# Patient Record
Sex: MALE | Race: WHITE | NOT HISPANIC OR LATINO | Employment: OTHER | URBAN - METROPOLITAN AREA
[De-identification: names, ages, dates, MRNs, and addresses within clinical notes are randomized per-mention and may not be internally consistent; named-entity substitution may affect disease eponyms.]

---

## 2023-09-10 PROBLEM — C91.10 CHRONIC LYMPHOCYTIC LEUKEMIA (MULTI): Status: ACTIVE | Noted: 2023-09-10

## 2023-09-10 PROBLEM — E78.5 HYPERLIPIDEMIA: Status: ACTIVE | Noted: 2023-09-10

## 2023-09-10 PROBLEM — G47.33 OSA (OBSTRUCTIVE SLEEP APNEA): Status: ACTIVE | Noted: 2023-09-10

## 2023-09-10 PROBLEM — D72.829 LEUKOCYTOSIS: Status: ACTIVE | Noted: 2023-09-10

## 2023-09-10 PROBLEM — M19.90 OSTEOARTHRITIS: Status: ACTIVE | Noted: 2023-09-10

## 2023-09-10 PROBLEM — N13.8 BENIGN PROSTATIC HYPERPLASIA WITH URINARY OBSTRUCTION: Status: ACTIVE | Noted: 2023-09-10

## 2023-09-10 PROBLEM — I49.8 BIGEMINY: Status: ACTIVE | Noted: 2023-09-10

## 2023-09-10 PROBLEM — H60.90 OTITIS EXTERNA: Status: ACTIVE | Noted: 2023-09-10

## 2023-09-10 PROBLEM — G47.61 PERIODIC LIMB MOVEMENT: Status: ACTIVE | Noted: 2023-09-10

## 2023-09-10 PROBLEM — R73.01 IMPAIRED FASTING GLUCOSE: Status: ACTIVE | Noted: 2023-09-10

## 2023-09-10 PROBLEM — E66.01 MORBID OBESITY DUE TO EXCESS CALORIES (MULTI): Status: ACTIVE | Noted: 2023-09-10

## 2023-09-10 PROBLEM — E03.9 HYPOTHYROIDISM: Status: ACTIVE | Noted: 2023-09-10

## 2023-09-10 PROBLEM — G47.8: Status: ACTIVE | Noted: 2023-09-10

## 2023-09-10 PROBLEM — N40.1 BENIGN PROSTATIC HYPERPLASIA WITH URINARY OBSTRUCTION: Status: ACTIVE | Noted: 2023-09-10

## 2023-09-10 PROBLEM — R00.1 SINUS BRADYCARDIA: Status: ACTIVE | Noted: 2023-09-10

## 2023-09-10 PROBLEM — I71.40 ABDOMINAL AORTIC ANEURYSM WITHOUT RUPTURE (CMS-HCC): Status: ACTIVE | Noted: 2023-09-10

## 2023-09-10 PROBLEM — G47.34 NOCTURNAL HYPOXEMIA: Status: ACTIVE | Noted: 2023-09-10

## 2023-09-10 PROBLEM — E55.9 VITAMIN D DEFICIENCY: Status: ACTIVE | Noted: 2023-09-10

## 2023-09-10 PROBLEM — I10 HYPERTENSION: Status: ACTIVE | Noted: 2023-09-10

## 2023-09-10 RX ORDER — TAMSULOSIN HYDROCHLORIDE 0.4 MG/1
1 CAPSULE ORAL DAILY
COMMUNITY
End: 2024-04-24

## 2023-09-10 RX ORDER — FUROSEMIDE 20 MG/1
1 TABLET ORAL DAILY
COMMUNITY
End: 2024-03-04

## 2023-09-10 RX ORDER — LEVOTHYROXINE SODIUM 100 UG/1
1 TABLET ORAL DAILY
COMMUNITY
End: 2024-05-09

## 2023-09-10 RX ORDER — PRAVASTATIN SODIUM 80 MG/1
1 TABLET ORAL DAILY
COMMUNITY
End: 2024-04-04

## 2023-09-10 RX ORDER — NAPROXEN SODIUM 220 MG
1 TABLET ORAL EVERY 12 HOURS PRN
COMMUNITY

## 2023-12-20 DIAGNOSIS — C91.10 CHRONIC LYMPHOCYTIC LEUKEMIA (MULTI): Primary | ICD-10-CM

## 2023-12-21 ENCOUNTER — APPOINTMENT (OUTPATIENT)
Dept: HEMATOLOGY/ONCOLOGY | Facility: CLINIC | Age: 73
End: 2023-12-21
Payer: MEDICARE

## 2024-01-17 ENCOUNTER — OFFICE VISIT (OUTPATIENT)
Dept: HEMATOLOGY/ONCOLOGY | Facility: CLINIC | Age: 74
End: 2024-01-17
Payer: MEDICARE

## 2024-01-17 ENCOUNTER — LAB (OUTPATIENT)
Dept: LAB | Facility: CLINIC | Age: 74
End: 2024-01-17
Payer: MEDICARE

## 2024-01-17 VITALS
DIASTOLIC BLOOD PRESSURE: 87 MMHG | TEMPERATURE: 97.2 F | WEIGHT: 281.64 LBS | HEART RATE: 57 BPM | SYSTOLIC BLOOD PRESSURE: 147 MMHG | RESPIRATION RATE: 18 BRPM | BODY MASS INDEX: 41.43 KG/M2 | OXYGEN SATURATION: 94 %

## 2024-01-17 DIAGNOSIS — C91.10 CHRONIC LYMPHOCYTIC LEUKEMIA (MULTI): ICD-10-CM

## 2024-01-17 DIAGNOSIS — C91.10 CHRONIC LYMPHOCYTIC LEUKEMIA (MULTI): Primary | ICD-10-CM

## 2024-01-17 LAB
ALBUMIN SERPL BCP-MCNC: 4.3 G/DL (ref 3.4–5)
ALP SERPL-CCNC: 63 U/L (ref 33–136)
ALT SERPL W P-5'-P-CCNC: 25 U/L (ref 10–52)
ANION GAP SERPL CALC-SCNC: 13 MMOL/L (ref 10–20)
AST SERPL W P-5'-P-CCNC: 19 U/L (ref 9–39)
BASOPHILS # BLD MANUAL: 0 X10*3/UL (ref 0–0.1)
BASOPHILS NFR BLD MANUAL: 0 %
BILIRUB SERPL-MCNC: 0.6 MG/DL (ref 0–1.2)
BUN SERPL-MCNC: 24 MG/DL (ref 6–23)
CALCIUM SERPL-MCNC: 9 MG/DL (ref 8.6–10.3)
CHLORIDE SERPL-SCNC: 107 MMOL/L (ref 98–107)
CO2 SERPL-SCNC: 25 MMOL/L (ref 21–32)
CREAT SERPL-MCNC: 1.04 MG/DL (ref 0.5–1.3)
EGFRCR SERPLBLD CKD-EPI 2021: 75 ML/MIN/1.73M*2
EOSINOPHIL # BLD MANUAL: 0.29 X10*3/UL (ref 0–0.4)
EOSINOPHIL NFR BLD MANUAL: 2 %
ERYTHROCYTE [DISTWIDTH] IN BLOOD BY AUTOMATED COUNT: 13.2 % (ref 11.5–14.5)
GLUCOSE SERPL-MCNC: 130 MG/DL (ref 74–99)
HCT VFR BLD AUTO: 43.4 % (ref 41–52)
HGB BLD-MCNC: 14.9 G/DL (ref 13.5–17.5)
IMM GRANULOCYTES # BLD AUTO: 0.04 X10*3/UL (ref 0–0.5)
IMM GRANULOCYTES NFR BLD AUTO: 0.3 % (ref 0–0.9)
LDH SERPL L TO P-CCNC: 156 U/L (ref 84–246)
LYMPHOCYTES # BLD MANUAL: 8.87 X10*3/UL (ref 0.8–3)
LYMPHOCYTES NFR BLD MANUAL: 62 %
MCH RBC QN AUTO: 31.7 PG (ref 26–34)
MCHC RBC AUTO-ENTMCNC: 34.3 G/DL (ref 32–36)
MCV RBC AUTO: 92 FL (ref 80–100)
MONOCYTES # BLD MANUAL: 0.72 X10*3/UL (ref 0.05–0.8)
MONOCYTES NFR BLD MANUAL: 5 %
NEUTS SEG # BLD MANUAL: 4.43 X10*3/UL (ref 1.6–5)
NEUTS SEG NFR BLD MANUAL: 31 %
NRBC BLD-RTO: 0 /100 WBCS (ref 0–0)
PLATELET # BLD AUTO: 203 X10*3/UL (ref 150–450)
POTASSIUM SERPL-SCNC: 4 MMOL/L (ref 3.5–5.3)
PROT SERPL-MCNC: 6.9 G/DL (ref 6.4–8.2)
RBC # BLD AUTO: 4.7 X10*6/UL (ref 4.5–5.9)
RBC MORPH BLD: ABNORMAL
SODIUM SERPL-SCNC: 141 MMOL/L (ref 136–145)
TOTAL CELLS COUNTED BLD: 100
WBC # BLD AUTO: 14.3 X10*3/UL (ref 4.4–11.3)

## 2024-01-17 PROCEDURE — 99213 OFFICE O/P EST LOW 20 MIN: CPT | Performed by: NURSE PRACTITIONER

## 2024-01-17 PROCEDURE — 84075 ASSAY ALKALINE PHOSPHATASE: CPT

## 2024-01-17 PROCEDURE — 85027 COMPLETE CBC AUTOMATED: CPT

## 2024-01-17 PROCEDURE — 1125F AMNT PAIN NOTED PAIN PRSNT: CPT | Performed by: NURSE PRACTITIONER

## 2024-01-17 PROCEDURE — 3079F DIAST BP 80-89 MM HG: CPT | Performed by: NURSE PRACTITIONER

## 2024-01-17 PROCEDURE — 83615 LACTATE (LD) (LDH) ENZYME: CPT | Performed by: NURSE PRACTITIONER

## 2024-01-17 PROCEDURE — 1036F TOBACCO NON-USER: CPT | Performed by: NURSE PRACTITIONER

## 2024-01-17 PROCEDURE — 3077F SYST BP >= 140 MM HG: CPT | Performed by: NURSE PRACTITIONER

## 2024-01-17 PROCEDURE — 36415 COLL VENOUS BLD VENIPUNCTURE: CPT

## 2024-01-17 PROCEDURE — 85007 BL SMEAR W/DIFF WBC COUNT: CPT

## 2024-01-17 ASSESSMENT — COLUMBIA-SUICIDE SEVERITY RATING SCALE - C-SSRS
2. HAVE YOU ACTUALLY HAD ANY THOUGHTS OF KILLING YOURSELF?: NO
1. IN THE PAST MONTH, HAVE YOU WISHED YOU WERE DEAD OR WISHED YOU COULD GO TO SLEEP AND NOT WAKE UP?: NO
6. HAVE YOU EVER DONE ANYTHING, STARTED TO DO ANYTHING, OR PREPARED TO DO ANYTHING TO END YOUR LIFE?: NO

## 2024-01-17 ASSESSMENT — PATIENT HEALTH QUESTIONNAIRE - PHQ9
1. LITTLE INTEREST OR PLEASURE IN DOING THINGS: NOT AT ALL
SUM OF ALL RESPONSES TO PHQ9 QUESTIONS 1 AND 2: 0
2. FEELING DOWN, DEPRESSED OR HOPELESS: NOT AT ALL

## 2024-01-17 ASSESSMENT — ENCOUNTER SYMPTOMS
LOSS OF SENSATION IN FEET: 0
DEPRESSION: 0
OCCASIONAL FEELINGS OF UNSTEADINESS: 1

## 2024-01-17 ASSESSMENT — PAIN SCALES - GENERAL: PAINLEVEL: 6

## 2024-01-17 NOTE — PROGRESS NOTES
Patient ID: Silvino Jacobo is a 73 y.o. male.    History of Present Illness:  Patient had been referred by his primary care provider for an elevated white count of 23,000. Hemoglobin and platelets were normal. There was a predominance of lymphocytes. He was seen in our office initially in 2017. At that time, his white count was 23,600. Flow cytometry showed a B cell population consistent with CLL. FISH studies showed normal results. The patient has not required any treatments.    Interval History:  He is seen today in follow up evaluation. He denies any fevers, chills or night sweats. No cough, chest pain or shortness of breath. No nausea or vomiting. No diarrhea or constipation. He has not had any unexplained weight loss. No night sweats. No lymphadenopathy. He is working on improved diet choices.   He does have poor dentition. He has had several broken teeth. He has meet with an oral surgeon and is considering dentures with implants.    Review of Systems:  A review of systems has been completed and are negative for complaints except what is stated in the HPI and/or past medical history    Allergies:  NKDA    Medications:  Medication list reviewed with patient and updated in EMR    Past Medical History:  Hypercholesterolemia, hypertension, chronic lymphocytic leukemia of B-cell type not having achieved remission    Social History:  He used to smoke but quit in . He lives by himself. He is single. He used to work as a .     Vital Signs:  /87 (BP Location: Right arm, Patient Position: Sitting, BP Cuff Size: Large adult long)   Pulse 57   Temp 36.2 °C (97.2 °F) (Temporal)   Resp 18   Wt 128 kg (281 lb 10.2 oz)   SpO2 94%   BMI 41.43 kg/m²     Physical Exam:  ECO  Pain: 0  Constitutional: Well developed, awake/alert/oriented x3, no distress, alert and cooperative  Eyes: PER. sclera anicteric  ENMT: Oral mucosa moist  Respiratory/Thorax: Breathing is non-labored. Lungs are clear  to auscultation bilaterally. No adventitious breath sounds  Cardiovascular: S1-S2. Regular rate and rhythm. No murmurs, rubs, or gallops appreciated  Gastrointestinal: Abdomen soft nontender, nondistended, normal active bowel sounds. No hepatosplenomegly  Musculoskeletal: ROM intact, no joint swelling, normal strength  Extremities: normal extremities, no cyanosis, no edema, no clubbing  Lymphatics: no palpable lymphadenopathy   Neurologic: alert and oriented x3. Nonfocal exam. No myoclonus  Psychological: Pleasant, appropriate and easily engaged     Labs:  CBC date/time       WBC     HGB     HCT     PLT     Neut      13-Dec-2022 09:07   12.5(H) 15.8    44.5    206     N/A  20-Jun-2022 09:39   12.3(H) 15.2    43.1    159     N/A    June 20, 2023  WBC 13.9, HGB/HCT 15.0/43.3, ,000    January 17, 2024  WBC 14.3, hemoglobin 14.9, hematocrit 43.4, platelets 203,000  Absolute lymphocyte count 8.87    Assessment:  CLL:  - counts remain stable. He is not requiring treatment at this juncture.  - he may have dental work as necessary. We discussed monitoring for s/s infection.     Plan:  - 6 month follow-up  - labs on return     SHARON Patterson-CNP

## 2024-01-24 ENCOUNTER — OFFICE VISIT (OUTPATIENT)
Dept: SLEEP MEDICINE | Facility: CLINIC | Age: 74
End: 2024-01-24
Payer: MEDICARE

## 2024-01-24 VITALS
SYSTOLIC BLOOD PRESSURE: 130 MMHG | HEART RATE: 60 BPM | BODY MASS INDEX: 39.2 KG/M2 | HEIGHT: 71 IN | OXYGEN SATURATION: 98 % | WEIGHT: 280 LBS | DIASTOLIC BLOOD PRESSURE: 68 MMHG

## 2024-01-24 DIAGNOSIS — G47.33 OBSTRUCTIVE SLEEP APNEA (ADULT) (PEDIATRIC): Primary | ICD-10-CM

## 2024-01-24 DIAGNOSIS — H04.202 EYE TEARING, LEFT: ICD-10-CM

## 2024-01-24 DIAGNOSIS — J31.0 CHRONIC RHINITIS: ICD-10-CM

## 2024-01-24 DIAGNOSIS — G47.33 OSA (OBSTRUCTIVE SLEEP APNEA): ICD-10-CM

## 2024-01-24 PROCEDURE — 99214 OFFICE O/P EST MOD 30 MIN: CPT | Performed by: INTERNAL MEDICINE

## 2024-01-24 PROCEDURE — 1126F AMNT PAIN NOTED NONE PRSNT: CPT | Performed by: INTERNAL MEDICINE

## 2024-01-24 PROCEDURE — 1036F TOBACCO NON-USER: CPT | Performed by: INTERNAL MEDICINE

## 2024-01-24 PROCEDURE — 1159F MED LIST DOCD IN RCRD: CPT | Performed by: INTERNAL MEDICINE

## 2024-01-24 PROCEDURE — 3075F SYST BP GE 130 - 139MM HG: CPT | Performed by: INTERNAL MEDICINE

## 2024-01-24 PROCEDURE — 3078F DIAST BP <80 MM HG: CPT | Performed by: INTERNAL MEDICINE

## 2024-01-24 RX ORDER — FLUTICASONE PROPIONATE 50 MCG
2 SPRAY, SUSPENSION (ML) NASAL NIGHTLY
Qty: 16 G | Refills: 2 | Status: SHIPPED | OUTPATIENT
Start: 2024-01-24 | End: 2024-01-25 | Stop reason: SDUPTHER

## 2024-01-24 ASSESSMENT — SLEEP AND FATIGUE QUESTIONNAIRES
HOW LIKELY ARE YOU TO NOD OFF OR FALL ASLEEP WHEN YOU ARE A PASSENGER IN A CAR FOR AN HOUR WITHOUT A BREAK: WOULD NEVER DOZE
SITING INACTIVE IN A PUBLIC PLACE LIKE A CLASS ROOM OR A MOVIE THEATER: WOULD NEVER DOZE
SITTING AND READING: 1
HOW LIKELY ARE YOU TO NOD OFF OR FALL ASLEEP IN A CAR, WHILE STOPPED FOR A FEW MINUTES IN TRAFFIC: WOULD NEVER DOZE
HOW LIKELY ARE YOU TO NOD OFF OR FALL ASLEEP WHILE SITTING AND TALKING TO SOMEONE: WOULD NEVER DOZE
HOW LIKELY ARE YOU TO NOD OFF OR FALL ASLEEP WHILE SITTING AND READING: SLIGHT CHANCE OF DOZING
ESS-CHAD TOTAL SCORE: 4
HOW LIKELY ARE YOU TO NOD OFF OR FALL ASLEEP WHILE WATCHING TV: SLIGHT CHANCE OF DOZING
HOW LIKELY ARE YOU TO NOD OFF OR FALL ASLEEP WHILE SITTING QUIETLY AFTER LUNCH WITHOUT ALCOHOL: WOULD NEVER DOZE
HOW LIKELY ARE YOU TO NOD OFF OR FALL ASLEEP WHILE LYING DOWN TO REST IN THE AFTERNOON WHEN CIRCUMSTANCES PERMIT: MODERATE CHANCE OF DOZING

## 2024-01-24 ASSESSMENT — PAIN SCALES - GENERAL: PAINLEVEL: 0-NO PAIN

## 2024-01-24 NOTE — ASSESSMENT & PLAN NOTE
Could this be from nasal congestion blocking the lacrimal duct? Will reevaluate at next visit to see if there is improvement with topical nasal steroid. I did explain that there are multiple causes of tearing and that I am not certain that this is the etiology and he may consider investigating this further with his PCP

## 2024-01-24 NOTE — PATIENT INSTRUCTIONS
How to use nasal sprays properly: If you have nasal congestion or allergies, improving your breathing through the nose is critical for treating sleep apnea and improving your sleep. Hence, intranasal steroid spray like Flonase or Nasocort (generic name is Fluticasone) might help. In some patients with sleep apnea, using intranasal steroid spray allowed them to tolerate CPAP mask better.     Intranasal steroids are usually prescribed as 2 sprays per nostril 1 hour before bedtime. If you administer intranasal steroids too close to bedtime, it might not work as well.  If you have nasal congestion or allergies during day despite using Flonase, try adding Azelastine nasal spray 2 sprays per nostril in the morning. Alternatively, can consider adding over-the-counter non-sedating antihistamine medications.     However, if you have severe nasal congestion or allergies despite using both nasal sprays, consider seeing an allergy specialist to confirm which substance you are sensitive to and also get definitive treatment which may be in the form of injections, oral pills, different kind of nose sprays, and/or a combination of everything said.     Below are instructions on how to use intranasal steroid spray properly:  Sit up or stand up with your face down.  Shake the spray bottle and insert its tip in one nostril.  Point the spray towards your ear, and not towards the middle of your nose. Pointing the tip upward and in the middle of the nose can lead to discomfort and irritation of nasal mucosa which can lead to nose bleeding or coughing up tinges of blood.  Squeeze the spray bottle and administer the recommended amount of spray.   Don't sniff when you spray. Remove the spray bottle.  Pinch your nose and hold it for a count of 10.   Perform steps 1-6 on the other nostril.

## 2024-01-24 NOTE — PROGRESS NOTES
"    Subjective   Patient ID: Silvino Jacobo is a 73 y.o. male who presents for Sleep Apnea.  PAP adherence  HPI    Prior Sleep History:      Current Sleep History:      A downloaded compliance report was reviewed and was interpreted by myself as follows:  > 4 hour compliance was 97%, with an average use of 5 hours and 15 minutes, with a residual AHI 3.3 on CPAP 12 cm H2O.  Mask leak 95th percentile 44.9.     Silvino Jacobo reports good benefit from his device.  Had issues with mask leak previously was ordered a mask fitting patient reports that he did not get a mask refit.  He switched to a nasal pillows mask and he his mouth venting. Waking up with lips flapping and dry mouth. Struggles with nasal congesting and tearing of the left eye    ESS: 4     Review of Systems  Review of systems negative except as per HPI  Objective   /68   Pulse 60   Ht 1.803 m (5' 11\")   Wt 127 kg (280 lb)   SpO2 98%   BMI 39.05 kg/m²    Physical Exam  PHYSICAL EXAM: GENERAL: alert pleasant and cooperative no acute distress  pink and hypertrophic turbinates  MODIFIED WIN SCORE: IV  MODIFIED MALLAMPATI SCORE: IV (only hard palate visible)  PSYCH EXAM: alert,oriented, in NAD with a full range of affect, normal behavior and no psychotic features    No results found for: \"IRON\", \"TIBC\", \"FERRITIN\"     Assessment/Plan   Problem List Items Addressed This Visit             ICD-10-CM    Obstructive sleep apnea (adult) (pediatric) - Primary G47.33     - Silvino Jacobo  has sleep apnea and requires treatment.  - Silvino Jacobo demonstrates good compliance and benefit from PAP therapy  - [facility]  - Order for renewal of PAP supplies placed          Chronic rhinitis J31.0    Relevant Medications    fluticasone (Flonase) 50 mcg/actuation nasal spray    Eye tearing, left H04.202     Could this be from nasal congestion blocking the lacrimal duct? Will reevaluate at next visit to see if there is improvement " with topical nasal steroid. I did explain that there are multiple causes of tearing and that I am not certain that this is the etiology and he may consider investigating this further with his PCP

## 2024-01-24 NOTE — ASSESSMENT & PLAN NOTE
- Silvino Jacobo  has sleep apnea and requires treatment.  - Silvino Jacobo demonstrates good compliance and benefit from PAP therapy  - Continue CPAP 12 cmH20 through [facility]  - Order for renewal of PAP supplies placed

## 2024-01-25 DIAGNOSIS — J31.0 CHRONIC RHINITIS: ICD-10-CM

## 2024-01-25 RX ORDER — FLUTICASONE PROPIONATE 50 MCG
2 SPRAY, SUSPENSION (ML) NASAL NIGHTLY
Qty: 16 G | Refills: 2 | Status: SHIPPED | OUTPATIENT
Start: 2024-01-25 | End: 2024-01-29

## 2024-01-29 RX ORDER — FLUTICASONE PROPIONATE 50 MCG
SPRAY, SUSPENSION (ML) NASAL
Qty: 48 G | Refills: 3 | Status: SHIPPED | OUTPATIENT
Start: 2024-01-29

## 2024-03-02 DIAGNOSIS — I10 HYPERTENSION, UNSPECIFIED TYPE: ICD-10-CM

## 2024-03-04 RX ORDER — FUROSEMIDE 20 MG/1
20 TABLET ORAL DAILY
Qty: 90 TABLET | Refills: 3 | Status: SHIPPED | OUTPATIENT
Start: 2024-03-04 | End: 2024-04-24 | Stop reason: WASHOUT

## 2024-04-04 DIAGNOSIS — E78.2 MIXED HYPERLIPIDEMIA: Primary | ICD-10-CM

## 2024-04-04 RX ORDER — PRAVASTATIN SODIUM 80 MG/1
80 TABLET ORAL DAILY
Qty: 90 TABLET | Refills: 1 | Status: SHIPPED | OUTPATIENT
Start: 2024-04-04

## 2024-04-24 ENCOUNTER — LAB (OUTPATIENT)
Dept: LAB | Facility: LAB | Age: 74
End: 2024-04-24
Payer: MEDICARE

## 2024-04-24 ENCOUNTER — OFFICE VISIT (OUTPATIENT)
Dept: PRIMARY CARE | Facility: CLINIC | Age: 74
End: 2024-04-24
Payer: MEDICARE

## 2024-04-24 VITALS
TEMPERATURE: 98.2 F | HEART RATE: 56 BPM | OXYGEN SATURATION: 98 % | WEIGHT: 282 LBS | DIASTOLIC BLOOD PRESSURE: 76 MMHG | HEIGHT: 71 IN | SYSTOLIC BLOOD PRESSURE: 136 MMHG | BODY MASS INDEX: 39.48 KG/M2

## 2024-04-24 DIAGNOSIS — I10 PRIMARY HYPERTENSION: ICD-10-CM

## 2024-04-24 DIAGNOSIS — I10 HYPERTENSION, UNSPECIFIED TYPE: ICD-10-CM

## 2024-04-24 DIAGNOSIS — R73.01 IMPAIRED FASTING GLUCOSE: ICD-10-CM

## 2024-04-24 DIAGNOSIS — E55.9 VITAMIN D DEFICIENCY: ICD-10-CM

## 2024-04-24 DIAGNOSIS — Z01.89 ENCOUNTER FOR ROUTINE LABORATORY TESTING: ICD-10-CM

## 2024-04-24 DIAGNOSIS — E03.9 ACQUIRED HYPOTHYROIDISM: ICD-10-CM

## 2024-04-24 DIAGNOSIS — Z12.5 ENCOUNTER FOR PROSTATE CANCER SCREENING: ICD-10-CM

## 2024-04-24 DIAGNOSIS — R60.0 LOWER EXTREMITY EDEMA: ICD-10-CM

## 2024-04-24 DIAGNOSIS — Z00.00 ENCOUNTER FOR ANNUAL WELLNESS EXAM IN MEDICARE PATIENT: Primary | ICD-10-CM

## 2024-04-24 DIAGNOSIS — I71.40 ABDOMINAL AORTIC ANEURYSM (AAA) WITHOUT RUPTURE, UNSPECIFIED PART (CMS-HCC): ICD-10-CM

## 2024-04-24 DIAGNOSIS — E78.2 MIXED HYPERLIPIDEMIA: ICD-10-CM

## 2024-04-24 DIAGNOSIS — Z23 ENCOUNTER FOR IMMUNIZATION: ICD-10-CM

## 2024-04-24 LAB
25(OH)D3 SERPL-MCNC: 47 NG/ML (ref 31–100)
ALBUMIN SERPL-MCNC: 4.6 G/DL (ref 3.5–5)
ALP BLD-CCNC: 80 U/L (ref 35–125)
ALT SERPL-CCNC: 29 U/L (ref 5–40)
ANION GAP SERPL CALC-SCNC: 16 MMOL/L
AST SERPL-CCNC: 27 U/L (ref 5–40)
BILIRUB SERPL-MCNC: 0.8 MG/DL (ref 0.1–1.2)
BUN SERPL-MCNC: 19 MG/DL (ref 8–25)
CALCIUM SERPL-MCNC: 9.7 MG/DL (ref 8.5–10.4)
CHLORIDE SERPL-SCNC: 103 MMOL/L (ref 97–107)
CHOLEST SERPL-MCNC: 145 MG/DL (ref 133–200)
CHOLEST/HDLC SERPL: 4.1 {RATIO}
CO2 SERPL-SCNC: 24 MMOL/L (ref 24–31)
CREAT SERPL-MCNC: 1.2 MG/DL (ref 0.4–1.6)
EGFRCR SERPLBLD CKD-EPI 2021: 63 ML/MIN/1.73M*2
EST. AVERAGE GLUCOSE BLD GHB EST-MCNC: 134 MG/DL
GLUCOSE SERPL-MCNC: 127 MG/DL (ref 65–99)
HBA1C MFR BLD: 6.3 %
HDLC SERPL-MCNC: 35 MG/DL
LDLC SERPL CALC-MCNC: 77 MG/DL (ref 65–130)
POTASSIUM SERPL-SCNC: 4.5 MMOL/L (ref 3.4–5.1)
PROT SERPL-MCNC: 7.2 G/DL (ref 5.9–7.9)
PSA SERPL-MCNC: 0.5 NG/ML
SODIUM SERPL-SCNC: 143 MMOL/L (ref 133–145)
TRIGL SERPL-MCNC: 165 MG/DL (ref 40–150)
TSH SERPL DL<=0.05 MIU/L-ACNC: 2.55 MIU/L (ref 0.27–4.2)

## 2024-04-24 PROCEDURE — 80053 COMPREHEN METABOLIC PANEL: CPT

## 2024-04-24 PROCEDURE — 3078F DIAST BP <80 MM HG: CPT | Performed by: NURSE PRACTITIONER

## 2024-04-24 PROCEDURE — 90677 PCV20 VACCINE IM: CPT | Performed by: NURSE PRACTITIONER

## 2024-04-24 PROCEDURE — 36415 COLL VENOUS BLD VENIPUNCTURE: CPT

## 2024-04-24 PROCEDURE — 3075F SYST BP GE 130 - 139MM HG: CPT | Performed by: NURSE PRACTITIONER

## 2024-04-24 PROCEDURE — 84153 ASSAY OF PSA TOTAL: CPT

## 2024-04-24 PROCEDURE — 84443 ASSAY THYROID STIM HORMONE: CPT

## 2024-04-24 PROCEDURE — 80061 LIPID PANEL: CPT

## 2024-04-24 PROCEDURE — 1036F TOBACCO NON-USER: CPT | Performed by: NURSE PRACTITIONER

## 2024-04-24 PROCEDURE — 99215 OFFICE O/P EST HI 40 MIN: CPT | Performed by: NURSE PRACTITIONER

## 2024-04-24 PROCEDURE — 1125F AMNT PAIN NOTED PAIN PRSNT: CPT | Performed by: NURSE PRACTITIONER

## 2024-04-24 PROCEDURE — 1160F RVW MEDS BY RX/DR IN RCRD: CPT | Performed by: NURSE PRACTITIONER

## 2024-04-24 PROCEDURE — G0439 PPPS, SUBSEQ VISIT: HCPCS | Performed by: NURSE PRACTITIONER

## 2024-04-24 PROCEDURE — 83036 HEMOGLOBIN GLYCOSYLATED A1C: CPT

## 2024-04-24 PROCEDURE — 1159F MED LIST DOCD IN RCRD: CPT | Performed by: NURSE PRACTITIONER

## 2024-04-24 PROCEDURE — 82306 VITAMIN D 25 HYDROXY: CPT

## 2024-04-24 RX ORDER — FUROSEMIDE 20 MG/1
20 TABLET ORAL DAILY
Start: 2024-04-24

## 2024-04-24 RX ORDER — ATENOLOL 50 MG/1
50 TABLET ORAL DAILY
Start: 2024-04-24 | End: 2024-05-09

## 2024-04-24 ASSESSMENT — LIFESTYLE VARIABLES
HOW OFTEN DURING THE LAST YEAR HAVE YOU BEEN UNABLE TO REMEMBER WHAT HAPPENED THE NIGHT BEFORE BECAUSE YOU HAD BEEN DRINKING: NEVER
AUDIT-C TOTAL SCORE: 1
HOW OFTEN DURING THE LAST YEAR HAVE YOU FAILED TO DO WHAT WAS NORMALLY EXPECTED FROM YOU BECAUSE OF DRINKING: NEVER
SKIP TO QUESTIONS 9-10: 1
HAS A RELATIVE, FRIEND, DOCTOR, OR ANOTHER HEALTH PROFESSIONAL EXPRESSED CONCERN ABOUT YOUR DRINKING OR SUGGESTED YOU CUT DOWN: NO
HOW MANY STANDARD DRINKS CONTAINING ALCOHOL DO YOU HAVE ON A TYPICAL DAY: 1 OR 2
HOW OFTEN DO YOU HAVE SIX OR MORE DRINKS ON ONE OCCASION: NEVER
HOW OFTEN DO YOU HAVE A DRINK CONTAINING ALCOHOL: MONTHLY OR LESS
HOW OFTEN DURING THE LAST YEAR HAVE YOU NEEDED AN ALCOHOLIC DRINK FIRST THING IN THE MORNING TO GET YOURSELF GOING AFTER A NIGHT OF HEAVY DRINKING: NEVER
HAVE YOU OR SOMEONE ELSE BEEN INJURED AS A RESULT OF YOUR DRINKING: NO
HOW OFTEN DURING THE LAST YEAR HAVE YOU HAD A FEELING OF GUILT OR REMORSE AFTER DRINKING: NEVER
AUDIT TOTAL SCORE: 1
HOW OFTEN DURING THE LAST YEAR HAVE YOU FOUND THAT YOU WERE NOT ABLE TO STOP DRINKING ONCE YOU HAD STARTED: NEVER

## 2024-04-24 ASSESSMENT — ENCOUNTER SYMPTOMS
AGITATION: 0
FACIAL ASYMMETRY: 0
DYSURIA: 0
PALPITATIONS: 0
DIAPHORESIS: 0
NECK PAIN: 0
CHILLS: 0
VOMITING: 0
ADENOPATHY: 0
SHORTNESS OF BREATH: 0
CHEST TIGHTNESS: 0
POLYPHAGIA: 0
SPEECH DIFFICULTY: 0
FEVER: 0
ABDOMINAL PAIN: 0
DIZZINESS: 0
FLANK PAIN: 0
CONFUSION: 0
NAUSEA: 0
BRUISES/BLEEDS EASILY: 0
HEMATURIA: 0
FATIGUE: 0
BLOOD IN STOOL: 0
POLYDIPSIA: 0
OCCASIONAL FEELINGS OF UNSTEADINESS: 1
COUGH: 0
WOUND: 0
SEIZURES: 0
DEPRESSION: 0
BACK PAIN: 0
LOSS OF SENSATION IN FEET: 0

## 2024-04-24 ASSESSMENT — PAIN SCALES - GENERAL: PAINLEVEL: 8

## 2024-04-24 ASSESSMENT — PATIENT HEALTH QUESTIONNAIRE - PHQ9
2. FEELING DOWN, DEPRESSED OR HOPELESS: NOT AT ALL
SUM OF ALL RESPONSES TO PHQ9 QUESTIONS 1 AND 2: 0
1. LITTLE INTEREST OR PLEASURE IN DOING THINGS: NOT AT ALL

## 2024-04-24 NOTE — PROGRESS NOTES
"[facility]: [facility]  MEDICARE WELLNESS EXAM      Silvino Jacobo is a 73 y.o. male that is presenting today for Annual Medicare Wellness Exam.      Patient reports he stopped taking Tamsulosin shortly after starting as he felt he was not having urinary symptoms enough to warrant taking it. He reports nocturia x2, urine stream is strong but occasionally \"on the weak side\".  He is taking Levothyroxine daily without complains. Tolerating statin without myalgias or arthralgia. He takes furosemide for bilateral feet swelling, reports taking \"a couple time a week if that\". No new health complaints.    Patient is followed by Hematology, Oscar Baez CNP, for Chronic Lymphocytic Leukemia. Last seen 01/2024, every 6 months.      Assessment/Plan    Diagnoses and all orders for this visit:    Encounter for annual wellness exam in Medicare patient    Primary hypertension  -     Acceptable control  -     Continue Atenolol 50 mg daily  -     CBC and Auto Differential; Future  -     Comprehensive Metabolic Panel; Future  -     Lipid Panel; Future    Mixed hyperlipidemia  -     Tolerating statin  -     Continue Pravastatin 80 mg daily  -     Comprehensive Metabolic Panel; Future  -     Lipid Panel; Future    Acquired hypothyroidism  -     Clinically Euthyroid  -     Continue Levothyroxine 100 mg daily  -     TSH with reflex to Free T4 if abnormal; Future    Vitamin D deficiency  -     Vitamin D 25-Hydroxy,Total (for eval of Vitamin D levels); Future  -     Continue daily OTC Vitamin d supplement    Impaired fasting glucose  -     Comprehensive Metabolic Panel; Future  -     Hemoglobin A1C; Future    Encounter for routine laboratory testing  -     CBC and Auto Differential; Future  -     Comprehensive Metabolic Panel; Future  -     Lipid Panel; Future  -     TSH with reflex to Free T4 if abnormal; Future  -     Prostate Specific Antigen; Future  -     Hemoglobin A1C; Future  -     Vitamin D " 25-Hydroxy,Total (for eval of Vitamin D levels); Future    Encounter for prostate cancer screening  -     Prostate Specific Antigen; Future    Lower extremity edema  -     furosemide (Lasix) 20 mg tablet; Take 1 tablet (20 mg) by mouth once daily. as directed    Encounter for immunization  -     Pneumococcal conjugate vaccine, 20-valent (PREVNAR 20)    Abdominal aortic aneurysm (AAA) without rupture, unspecified part (CMS-[facility])  -     Screening AAA 05/11/2020 showed 3.5 cm upper AAA. Will repeat study to assess status.  -     Vascular US abdominal aorta anuerysm AAA screening; Future    Other orders  -     Follow Up In Primary Care - Medicare Annual; Future    ADVANCED CARE PLANNING  Advanced Care Planning was discussed with patient:  The patient has an active advanced care plan on file. The patient has an active surrogate decision-maker on file.  Encouraged the patient to confirm that Living Will and Healthcare Power of  (HCPoA) are accurate and up to date.  Encouraged the patient to confirm that our office be provided a copy of any documentation in the event that anything changes.    ACTIVITIES OF DAILY LIVING  Basic ADLs:  Bathing: Independent, Dressing: Independent, Toileting: Independent, Transferring: Independent, Continence: Independent, Feeding: Independent.    Instrumental ADLs:  Ability to use phone: Independent, Shopping: Independent, Cooking: Independent, House-keeping: Independent, Laundry: Independent, Transportation: Independent, Medication Management: Independent, Finance Management: Independent.    Subjective   HPI  Review of Systems   Constitutional:  Negative for chills, diaphoresis, fatigue and fever.   HENT:  Negative for hearing loss and mouth sores.    Eyes:  Negative for visual disturbance.   Respiratory:  Negative for cough, chest tightness and shortness of breath.    Cardiovascular:  Negative for chest pain, palpitations and leg swelling.   Gastrointestinal:  Negative for abdominal  pain, blood in stool, nausea and vomiting.   Endocrine: Negative for cold intolerance, heat intolerance, polydipsia, polyphagia and polyuria.   Genitourinary:  Negative for dysuria, flank pain and hematuria.   Musculoskeletal:  Negative for back pain and neck pain.   Skin:  Negative for rash and wound.   Allergic/Immunologic: Negative for environmental allergies, food allergies and immunocompromised state.   Neurological:  Negative for dizziness, seizures, syncope, facial asymmetry and speech difficulty.   Hematological:  Negative for adenopathy. Does not bruise/bleed easily.   Psychiatric/Behavioral:  Negative for agitation and confusion.      Objective   Vitals:    04/24/24 0822   BP: 136/76   Pulse: 56   Temp: 36.8 °C (98.2 °F)   SpO2: 98%      Body mass index is 39.33 kg/m².  Physical Exam  Vitals and nursing note reviewed.   Constitutional:       General: He is not in acute distress.     Appearance: Normal appearance. He is not ill-appearing.   HENT:      Head: Normocephalic and atraumatic.      Right Ear: Tympanic membrane, ear canal and external ear normal. There is no impacted cerumen.      Left Ear: Tympanic membrane, ear canal and external ear normal. There is no impacted cerumen.      Nose: Nose normal.      Mouth/Throat:      Mouth: Mucous membranes are moist.      Pharynx: Oropharynx is clear. No oropharyngeal exudate or posterior oropharyngeal erythema.   Eyes:      General: No scleral icterus.        Right eye: No discharge.         Left eye: No discharge.      Extraocular Movements: Extraocular movements intact.      Conjunctiva/sclera: Conjunctivae normal.      Pupils: Pupils are equal, round, and reactive to light.   Neck:      Vascular: No carotid bruit.   Cardiovascular:      Rate and Rhythm: Normal rate and regular rhythm.      Pulses: Normal pulses.      Heart sounds: Normal heart sounds. No murmur heard.  Pulmonary:      Effort: Pulmonary effort is normal. No respiratory distress.      Breath  sounds: Normal breath sounds.   Abdominal:      General: Abdomen is flat. Bowel sounds are normal. There is no distension.      Palpations: Abdomen is soft. There is no mass.      Tenderness: There is no abdominal tenderness. There is no right CVA tenderness or left CVA tenderness.      Hernia: No hernia is present.   Musculoskeletal:         General: No tenderness. Normal range of motion.      Cervical back: Normal range of motion. No tenderness.      Right lower leg: No edema.      Left lower leg: No edema.   Lymphadenopathy:      Cervical: No cervical adenopathy.   Skin:     General: Skin is warm and dry.      Coloration: Skin is not jaundiced.      Findings: No rash.   Neurological:      General: No focal deficit present.      Mental Status: He is alert and oriented to person, place, and time. Mental status is at baseline.   Psychiatric:         Mood and Affect: Mood normal.         Behavior: Behavior normal.       Diagnostic Results   Lab Results   Component Value Date    GLUCOSE 130 (H) 01/17/2024    CALCIUM 9.0 01/17/2024     01/17/2024    K 4.0 01/17/2024    CO2 25 01/17/2024     01/17/2024    BUN 24 (H) 01/17/2024    CREATININE 1.04 01/17/2024     Lab Results   Component Value Date    ALT 25 01/17/2024    AST 19 01/17/2024    ALKPHOS 63 01/17/2024    BILITOT 0.6 01/17/2024     Lab Results   Component Value Date    WBC 14.3 (H) 01/17/2024    HGB 14.9 01/17/2024    HCT 43.4 01/17/2024    MCV 92 01/17/2024     01/17/2024     Lab Results   Component Value Date    CHOL 210 (H) 04/19/2023    CHOL 151 04/13/2022    CHOL 161 04/07/2021     Lab Results   Component Value Date    HDL 34 (L) 04/19/2023    HDL 35 (L) 04/13/2022    HDL 33 (L) 04/07/2021     Lab Results   Component Value Date    LDLCALC 137 (H) 04/19/2023    LDLCALC 87 04/13/2022    LDLCALC 89 04/07/2021     Lab Results   Component Value Date    TRIG 194 (H) 04/19/2023    TRIG 147 04/13/2022    TRIG 195 (H) 04/07/2021     No components  "found for: \"CHOLHDL\"  Lab Results   Component Value Date    HGBA1C 5.8 03/19/2020     Other labs not included in the list above reviewed either before or during this encounter.    History   History reviewed. No pertinent past medical history.  History reviewed. No pertinent surgical history.  Family History   Problem Relation Name Age of Onset    Hypertension Mother      Heart disease Father       Social History     Socioeconomic History    Marital status: Single     Spouse name: Not on file    Number of children: Not on file    Years of education: Not on file    Highest education level: Not on file   Occupational History    Not on file   Tobacco Use    Smoking status: Never     Passive exposure: Never    Smokeless tobacco: Never   Vaping Use    Vaping status: Never Used   Substance and Sexual Activity    Alcohol use: Yes     Comment: ocassional    Drug use: Never    Sexual activity: Not on file   Other Topics Concern    Not on file   Social History Narrative    Not on file     Social Determinants of Health     Financial Resource Strain: Not on file   Food Insecurity: Not on file   Transportation Needs: Not on file   Physical Activity: Not on file   Stress: Not on file   Social Connections: Not on file   Intimate Partner Violence: Not on file   Housing Stability: Not on file     No Known Allergies  Current Outpatient Medications on File Prior to Visit   Medication Sig Dispense Refill    ATENOLOL ORAL Take 1 tablet by mouth once daily.      cholecalciferol, vitamin D3, (VITAMIN D3 ORAL) Take by mouth.      fluticasone (Flonase) 50 mcg/actuation nasal spray SHAKE GENTLY AND PRIME PUMP PRIOR TO 1ST USE, ADMINISTER 2 SPRAYS INTO EACH NOSTRIL ONCE DAILY. CLEAN AND REPLACE TIP AFTER EACH USE (Patient taking differently: if needed.) 48 g 3    levothyroxine (Synthroid, Levoxyl) 100 mcg tablet Take 1 tablet (100 mcg) by mouth once daily.      multivit-min/iron/folic acid/K (ADULTS MULTIVITAMIN ORAL) Take by mouth.      " naproxen sodium (Aleve) 220 mg tablet Take 1 tablet (220 mg) by mouth every 12 hours if needed.      pravastatin (Pravachol) 80 mg tablet TAKE 1 TABLET BY MOUTH EVERY DAY 90 tablet 1    tamsulosin (Flomax) 0.4 mg 24 hr capsule Take 1 capsule (0.4 mg) by mouth once daily.      furosemide (Lasix) 20 mg tablet TAKE 1 TABLET BY MOUTH EVERY DAY AS DIRECTED (Patient not taking: Reported on 4/24/2024) 90 tablet 3     No current facility-administered medications on file prior to visit.     Immunization History   Administered Date(s) Administered    Flu vaccine (IIV4), preservative free *Check age/dose* 12/15/2022    Pfizer COVID-19 vaccine, bivalent, age 12 years and older (30 mcg/0.3 mL) 12/15/2022    Pneumococcal conjugate vaccine, 13-valent (PREVNAR 13) 05/22/2018    Tdap vaccine, age 7 year and older (BOOSTRIX, ADACEL) 05/22/2018    Zoster vaccine, recombinant, adult (SHINGRIX) 05/14/2021, 07/19/2021     Patient's medical history was reviewed and updated either before or during this encounter.     Marie Malave, APRN-CNP

## 2024-04-25 NOTE — RESULT ENCOUNTER NOTE
Left voicemail, pt informed that lab work showed no significant abnormality and to call if he has any questions.

## 2024-05-09 DIAGNOSIS — E03.9 ACQUIRED HYPOTHYROIDISM: Primary | ICD-10-CM

## 2024-05-09 DIAGNOSIS — I10 PRIMARY HYPERTENSION: ICD-10-CM

## 2024-05-09 RX ORDER — LEVOTHYROXINE SODIUM 100 UG/1
100 TABLET ORAL DAILY
Qty: 90 TABLET | Refills: 1 | Status: SHIPPED | OUTPATIENT
Start: 2024-05-09

## 2024-05-09 RX ORDER — ATENOLOL 50 MG/1
50 TABLET ORAL DAILY
Qty: 90 TABLET | Refills: 1 | Status: SHIPPED | OUTPATIENT
Start: 2024-05-09

## 2024-05-10 ENCOUNTER — TELEPHONE (OUTPATIENT)
Dept: PRIMARY CARE | Facility: CLINIC | Age: 74
End: 2024-05-10
Payer: COMMERCIAL

## 2024-05-10 DIAGNOSIS — I10 ESSENTIAL (PRIMARY) HYPERTENSION: ICD-10-CM

## 2024-05-10 DIAGNOSIS — I71.40 ABDOMINAL AORTIC ANEURYSM (AAA) WITHOUT RUPTURE, UNSPECIFIED PART (CMS-HCC): Primary | ICD-10-CM

## 2024-05-10 NOTE — TELEPHONE ENCOUNTER
Frieda pt.  Radiology requesting new order for Vascular US abdominal aorta aneurysm.  Screening was ordered but needs to be diagnostic.  States can use codes [id number] or [id number].    Radiology will pull diagnostic order from [facility] and cancel screening.

## 2024-05-13 ENCOUNTER — APPOINTMENT (OUTPATIENT)
Dept: RADIOLOGY | Facility: CLINIC | Age: 74
End: 2024-05-13
Payer: MEDICARE

## 2024-05-13 ENCOUNTER — HOSPITAL ENCOUNTER (OUTPATIENT)
Dept: RADIOLOGY | Facility: CLINIC | Age: 74
Discharge: HOME | End: 2024-05-13
Payer: MEDICARE

## 2024-05-13 DIAGNOSIS — I71.40 ABDOMINAL AORTIC ANEURYSM (AAA) WITHOUT RUPTURE, UNSPECIFIED PART (CMS-HCC): ICD-10-CM

## 2024-05-13 DIAGNOSIS — I10 ESSENTIAL (PRIMARY) HYPERTENSION: ICD-10-CM

## 2024-05-13 PROCEDURE — 93978 VASCULAR STUDY: CPT

## 2024-05-13 PROCEDURE — 93979 VASCULAR STUDY: CPT | Performed by: RADIOLOGY

## 2024-05-15 DIAGNOSIS — I71.40 ABDOMINAL AORTIC ANEURYSM (AAA) WITHOUT RUPTURE, UNSPECIFIED PART (CMS-HCC): Primary | ICD-10-CM

## 2024-05-15 NOTE — RESULT ENCOUNTER NOTE
Please inform Mr. Jacobo his Ultrasound showed Mild aneurysmal dilation of the his abdominal aortic aneurysm in comparison to his last reading. I've place a referral to vascular medicine for follow up in case he is not been followed a tthis time.

## 2024-05-17 ENCOUNTER — TELEPHONE (OUTPATIENT)
Dept: PRIMARY CARE | Facility: CLINIC | Age: 74
End: 2024-05-17
Payer: COMMERCIAL

## 2024-05-17 NOTE — TELEPHONE ENCOUNTER
----- Message from Lynne Cabrera MA sent at 5/17/2024  8:52 AM EDT -----    ----- Message -----  From: JIMMIE Dodd  Sent: 5/15/2024   6:06 PM EDT  To: JIMMIE Rashid; #    Please inform Mr. Jacobo his Ultrasound showed Mild aneurysmal dilation of the his abdominal aortic aneurysm in comparison to his last reading. I've place a referral to vascular medicine for follow up in case he is not been followed a tthis time.

## 2024-07-15 DIAGNOSIS — E78.2 MIXED HYPERLIPIDEMIA: ICD-10-CM

## 2024-07-15 RX ORDER — PRAVASTATIN SODIUM 80 MG/1
80 TABLET ORAL DAILY
Qty: 90 TABLET | Refills: 1 | Status: SHIPPED | OUTPATIENT
Start: 2024-07-15

## 2024-07-25 ENCOUNTER — OFFICE VISIT (OUTPATIENT)
Dept: VASCULAR SURGERY | Facility: CLINIC | Age: 74
End: 2024-07-25
Payer: MEDICARE

## 2024-07-25 VITALS
OXYGEN SATURATION: 96 % | SYSTOLIC BLOOD PRESSURE: 132 MMHG | WEIGHT: 185 LBS | BODY MASS INDEX: 25.9 KG/M2 | HEIGHT: 71 IN | HEART RATE: 60 BPM | DIASTOLIC BLOOD PRESSURE: 72 MMHG

## 2024-07-25 DIAGNOSIS — R09.89 OTHER SPECIFIED SYMPTOMS AND SIGNS INVOLVING THE CIRCULATORY AND RESPIRATORY SYSTEMS: ICD-10-CM

## 2024-07-25 DIAGNOSIS — I71.40 ABDOMINAL AORTIC ANEURYSM (AAA) WITHOUT RUPTURE, UNSPECIFIED PART (CMS-HCC): ICD-10-CM

## 2024-07-25 PROCEDURE — 1036F TOBACCO NON-USER: CPT | Performed by: SURGERY

## 2024-07-25 PROCEDURE — 1159F MED LIST DOCD IN RCRD: CPT | Performed by: SURGERY

## 2024-07-25 PROCEDURE — 3075F SYST BP GE 130 - 139MM HG: CPT | Performed by: SURGERY

## 2024-07-25 PROCEDURE — 1160F RVW MEDS BY RX/DR IN RCRD: CPT | Performed by: SURGERY

## 2024-07-25 PROCEDURE — 1126F AMNT PAIN NOTED NONE PRSNT: CPT | Performed by: SURGERY

## 2024-07-25 PROCEDURE — 99215 OFFICE O/P EST HI 40 MIN: CPT | Performed by: SURGERY

## 2024-07-25 PROCEDURE — 3078F DIAST BP <80 MM HG: CPT | Performed by: SURGERY

## 2024-07-25 PROCEDURE — 99205 OFFICE O/P NEW HI 60 MIN: CPT | Performed by: SURGERY

## 2024-07-25 ASSESSMENT — LIFESTYLE VARIABLES
HOW OFTEN DO YOU HAVE A DRINK CONTAINING ALCOHOL: 2-4 TIMES A MONTH
SKIP TO QUESTIONS 9-10: 1
HOW MANY STANDARD DRINKS CONTAINING ALCOHOL DO YOU HAVE ON A TYPICAL DAY: 1 OR 2
AUDIT-C TOTAL SCORE: 2
HOW OFTEN DO YOU HAVE SIX OR MORE DRINKS ON ONE OCCASION: NEVER

## 2024-07-25 ASSESSMENT — COLUMBIA-SUICIDE SEVERITY RATING SCALE - C-SSRS
1. IN THE PAST MONTH, HAVE YOU WISHED YOU WERE DEAD OR WISHED YOU COULD GO TO SLEEP AND NOT WAKE UP?: NO
2. HAVE YOU ACTUALLY HAD ANY THOUGHTS OF KILLING YOURSELF?: NO
6. HAVE YOU EVER DONE ANYTHING, STARTED TO DO ANYTHING, OR PREPARED TO DO ANYTHING TO END YOUR LIFE?: NO

## 2024-07-25 ASSESSMENT — PAIN SCALES - GENERAL: PAINLEVEL: 0-NO PAIN

## 2024-07-25 ASSESSMENT — ENCOUNTER SYMPTOMS
LOSS OF SENSATION IN FEET: 0
DEPRESSION: 0
OCCASIONAL FEELINGS OF UNSTEADINESS: 0

## 2024-07-25 NOTE — PATIENT INSTRUCTIONS
- AAA is 3.6 cm. We will monitor for growth with another ultrasound in 1 year  - Hx of smoking. Carotid US in 1 year  - Followup 1 year

## 2024-07-29 NOTE — PROGRESS NOTES
Vascular Surgery Clinic Note    CC: Infrarenal AAA    HPI:  Silvino Jacobo is 74 y.o. male with history of small infrarenal AAA 3.6 cm found on surveillance US. He denies any chest, back, abdominal, or lower extremity pain. No family history of aneurysms. He quit smoking many years prior. He has never had vascular interventions or surgeries before.    Past Vascular History:  None    Past Vascular Testing:  Aortic US (5/13/24) - 3.6 cm  Aortic US screening (5/11/20) - 3.5 cm    Medical History:  Patient Active Problem List   Diagnosis    Abdominal aortic aneurysm without rupture (CMS-HCC)    Benign prostatic hyperplasia with urinary obstruction    Bigeminy    Chronic lymphocytic leukemia (Multi)    Hyperlipidemia    Hypertension    Hypothyroidism    Impaired fasting glucose    Leukocytosis    Morbid obesity due to excess calories (Multi)    Nocturnal hypoxemia    Organic mixed sleep disorder    Obstructive sleep apnea (adult) (pediatric)    Osteoarthritis    Otitis externa    Periodic limb movement    Sinus bradycardia    Vitamin D deficiency    Chronic rhinitis    Eye tearing, left        Meds:   Current Outpatient Medications on File Prior to Visit   Medication Sig Dispense Refill    atenolol (Tenormin) 50 mg tablet TAKE 1 TABLET BY MOUTH EVERY DAY 90 tablet 1    cholecalciferol, vitamin D3, (VITAMIN D3 ORAL) Take by mouth.      fluticasone (Flonase) 50 mcg/actuation nasal spray SHAKE GENTLY AND PRIME PUMP PRIOR TO 1ST USE, ADMINISTER 2 SPRAYS INTO EACH NOSTRIL ONCE DAILY. CLEAN AND REPLACE TIP AFTER EACH USE (Patient taking differently: if needed.) 48 g 3    furosemide (Lasix) 20 mg tablet Take 1 tablet (20 mg) by mouth once daily. as directed      levothyroxine (Synthroid, Levoxyl) 100 mcg tablet TAKE 1 TABLET BY MOUTH EVERY DAY 90 tablet 1    multivit-min/iron/folic acid/K (ADULTS MULTIVITAMIN ORAL) Take by mouth.      naproxen sodium (Aleve) 220 mg tablet Take 1 tablet (220 mg) by mouth every 12 hours if  needed.      pravastatin (Pravachol) 80 mg tablet TAKE 1 TABLET BY MOUTH EVERY DAY 90 tablet 1     No current facility-administered medications on file prior to visit.        Allergies:   No Known Allergies    SH:    Social Determinants of Health     Tobacco Use: Low Risk  (7/25/2024)    Patient History     Smoking Tobacco Use: Never     Smokeless Tobacco Use: Never     Passive Exposure: Never   Alcohol Use: Not At Risk (7/25/2024)    AUDIT-C     Frequency of Alcohol Consumption: 2-4 times a month     Average Number of Drinks: 1 or 2     Frequency of Binge Drinking: Never   Financial Resource Strain: Not on file   Food Insecurity: Not on file   Transportation Needs: Not on file   Physical Activity: Not on file   Stress: Not on file   Social Connections: Not on file   Intimate Partner Violence: Not on file   Depression: Not at risk (7/25/2024)    PHQ-2     PHQ-2 Score: 0   Housing Stability: Not on file   Utilities: Not on file   Digital Equity: Not on file   Health Literacy: Not on file        FH:  Family History   Problem Relation Name Age of Onset    Hypertension Mother      Heart disease Father          ROS:  All systems were reviewed and are negative except as per HPI.    Objective:  Vitals:  Vitals:    07/25/24 1007   BP: 132/72   Pulse:    SpO2:         Exam:  Constitutional: normal, well appearing  HEENT: normocephalic  CV: regular rate  RESP: symmetric expansion, unlabored breathing  GI: soft, nontender, nondistended  MSK: normal ROM  INT: no lesions  PSYCH: appropriate mood  NEURO: no deficits  VASC: No palpable pulsatile abdominal mass    Assessment & Plan:  Silvino Jacobo is 74 y.o. male with infrarenal AAA 3.6 cm     - AAA is 3.6 cm. We will monitor for growth with another ultrasound in 1 year  - Hx of smoking. Carotid US in 1 year  - Followup 1 year    Meds  - Pravastatin 80 mg    Screening/Surveillance  - AAA US (July 2025)    Next Followup  - 1 year    Carol Gonsales MD

## 2024-08-12 ENCOUNTER — OFFICE VISIT (OUTPATIENT)
Dept: HEMATOLOGY/ONCOLOGY | Facility: CLINIC | Age: 74
End: 2024-08-12
Payer: MEDICARE

## 2024-08-12 ENCOUNTER — LAB (OUTPATIENT)
Dept: LAB | Facility: CLINIC | Age: 74
End: 2024-08-12
Payer: MEDICARE

## 2024-08-12 VITALS
OXYGEN SATURATION: 95 % | RESPIRATION RATE: 16 BRPM | DIASTOLIC BLOOD PRESSURE: 75 MMHG | HEART RATE: 53 BPM | BODY MASS INDEX: 43.39 KG/M2 | SYSTOLIC BLOOD PRESSURE: 141 MMHG | WEIGHT: 286.27 LBS | TEMPERATURE: 97 F | HEIGHT: 68 IN

## 2024-08-12 DIAGNOSIS — C91.10 CHRONIC LYMPHOCYTIC LEUKEMIA (MULTI): ICD-10-CM

## 2024-08-12 DIAGNOSIS — C91.10 CHRONIC LYMPHOCYTIC LEUKEMIA (MULTI): Primary | ICD-10-CM

## 2024-08-12 LAB
ALBUMIN SERPL BCP-MCNC: 4.2 G/DL (ref 3.4–5)
ALP SERPL-CCNC: 65 U/L (ref 33–136)
ALT SERPL W P-5'-P-CCNC: 25 U/L (ref 10–52)
ANION GAP SERPL CALC-SCNC: 12 MMOL/L (ref 10–20)
AST SERPL W P-5'-P-CCNC: 20 U/L (ref 9–39)
BASOPHILS # BLD MANUAL: 0.13 X10*3/UL (ref 0–0.1)
BASOPHILS NFR BLD MANUAL: 1 %
BILIRUB SERPL-MCNC: 0.5 MG/DL (ref 0–1.2)
BUN SERPL-MCNC: 20 MG/DL (ref 6–23)
CALCIUM SERPL-MCNC: 9 MG/DL (ref 8.6–10.3)
CHLORIDE SERPL-SCNC: 107 MMOL/L (ref 98–107)
CO2 SERPL-SCNC: 25 MMOL/L (ref 21–32)
CREAT SERPL-MCNC: 1.07 MG/DL (ref 0.5–1.3)
EGFRCR SERPLBLD CKD-EPI 2021: 72 ML/MIN/1.73M*2
EOSINOPHIL # BLD MANUAL: 0 X10*3/UL (ref 0–0.4)
EOSINOPHIL NFR BLD MANUAL: 0 %
ERYTHROCYTE [DISTWIDTH] IN BLOOD BY AUTOMATED COUNT: 13.2 % (ref 11.5–14.5)
FERRITIN SERPL-MCNC: 241 NG/ML (ref 20–300)
GLUCOSE SERPL-MCNC: 119 MG/DL (ref 74–99)
HCT VFR BLD AUTO: 41.3 % (ref 41–52)
HGB BLD-MCNC: 14.4 G/DL (ref 13.5–17.5)
IMM GRANULOCYTES # BLD AUTO: 0.04 X10*3/UL (ref 0–0.5)
IMM GRANULOCYTES NFR BLD AUTO: 0.3 % (ref 0–0.9)
IRON SATN MFR SERPL: 34 % (ref 25–45)
IRON SERPL-MCNC: 106 UG/DL (ref 35–150)
LDH SERPL L TO P-CCNC: 154 U/L (ref 84–246)
LYMPHOCYTES # BLD MANUAL: 6.89 X10*3/UL (ref 0.8–3)
LYMPHOCYTES NFR BLD MANUAL: 53 %
MCH RBC QN AUTO: 32.6 PG (ref 26–34)
MCHC RBC AUTO-ENTMCNC: 34.9 G/DL (ref 32–36)
MCV RBC AUTO: 93 FL (ref 80–100)
MONOCYTES # BLD MANUAL: 0.39 X10*3/UL (ref 0.05–0.8)
MONOCYTES NFR BLD MANUAL: 3 %
NEUTS SEG # BLD MANUAL: 5.59 X10*3/UL (ref 1.6–5)
NEUTS SEG NFR BLD MANUAL: 43 %
NRBC BLD-RTO: 0 /100 WBCS (ref 0–0)
PLATELET # BLD AUTO: 195 X10*3/UL (ref 150–450)
POTASSIUM SERPL-SCNC: 4.1 MMOL/L (ref 3.5–5.3)
PROT SERPL-MCNC: 6.6 G/DL (ref 6.4–8.2)
RBC # BLD AUTO: 4.42 X10*6/UL (ref 4.5–5.9)
RBC MORPH BLD: ABNORMAL
SODIUM SERPL-SCNC: 140 MMOL/L (ref 136–145)
TIBC SERPL-MCNC: 314 UG/DL (ref 240–445)
TOTAL CELLS COUNTED BLD: 100
UIBC SERPL-MCNC: 208 UG/DL (ref 110–370)
WBC # BLD AUTO: 13 X10*3/UL (ref 4.4–11.3)

## 2024-08-12 PROCEDURE — 3077F SYST BP >= 140 MM HG: CPT | Performed by: NURSE PRACTITIONER

## 2024-08-12 PROCEDURE — 83540 ASSAY OF IRON: CPT | Performed by: NURSE PRACTITIONER

## 2024-08-12 PROCEDURE — 99213 OFFICE O/P EST LOW 20 MIN: CPT | Performed by: NURSE PRACTITIONER

## 2024-08-12 PROCEDURE — 1159F MED LIST DOCD IN RCRD: CPT | Performed by: NURSE PRACTITIONER

## 2024-08-12 PROCEDURE — 36415 COLL VENOUS BLD VENIPUNCTURE: CPT

## 2024-08-12 PROCEDURE — 85027 COMPLETE CBC AUTOMATED: CPT

## 2024-08-12 PROCEDURE — 82728 ASSAY OF FERRITIN: CPT | Performed by: NURSE PRACTITIONER

## 2024-08-12 PROCEDURE — 3078F DIAST BP <80 MM HG: CPT | Performed by: NURSE PRACTITIONER

## 2024-08-12 PROCEDURE — 80053 COMPREHEN METABOLIC PANEL: CPT

## 2024-08-12 PROCEDURE — 83615 LACTATE (LD) (LDH) ENZYME: CPT | Performed by: NURSE PRACTITIONER

## 2024-08-12 PROCEDURE — 85007 BL SMEAR W/DIFF WBC COUNT: CPT

## 2024-08-12 PROCEDURE — 1125F AMNT PAIN NOTED PAIN PRSNT: CPT | Performed by: NURSE PRACTITIONER

## 2024-08-12 ASSESSMENT — COLUMBIA-SUICIDE SEVERITY RATING SCALE - C-SSRS
6. HAVE YOU EVER DONE ANYTHING, STARTED TO DO ANYTHING, OR PREPARED TO DO ANYTHING TO END YOUR LIFE?: NO
2. HAVE YOU ACTUALLY HAD ANY THOUGHTS OF KILLING YOURSELF?: NO
1. IN THE PAST MONTH, HAVE YOU WISHED YOU WERE DEAD OR WISHED YOU COULD GO TO SLEEP AND NOT WAKE UP?: NO

## 2024-08-12 ASSESSMENT — PATIENT HEALTH QUESTIONNAIRE - PHQ9
2. FEELING DOWN, DEPRESSED OR HOPELESS: NOT AT ALL
1. LITTLE INTEREST OR PLEASURE IN DOING THINGS: NOT AT ALL
SUM OF ALL RESPONSES TO PHQ9 QUESTIONS 1 AND 2: 0

## 2024-08-12 ASSESSMENT — PAIN SCALES - GENERAL: PAINLEVEL: 6

## 2024-08-12 NOTE — PROGRESS NOTES
Patient here alone for follow up with GRACIELA Baez NP; seen for CLL.    Reconciled and reviewed medication and allergy list with patient.    Per patient he uses a cane at all times and has chronic pain in his knees.      Patient to return in February 2025 per NP orders having labs drawn prior.    Patient not using MyChart at this time; text sent for patient to register.    No barriers to education noted, pt. Agreed to plan and verbalized understanding using teach back method

## 2024-08-12 NOTE — PROGRESS NOTES
"Patient ID: Silvino Jacobo is a 74 y.o. male.    History of Present Illness:  Patient had been referred by his primary care provider for an elevated white count of 23,000. Hemoglobin and platelets were normal. There was a predominance of lymphocytes. He was seen in our office initially in 2017. At that time, his white count was 23,600. Flow cytometry showed a B cell population consistent with CLL. FISH studies showed normal results. The patient has not required any treatments.    Interval History:  He is seen today in follow up evaluation. He denies any fevers, chills or night sweats. No cough, chest pain or shortness of breath. No nausea or vomiting. No diarrhea or constipation. He has not had any unexplained weight loss. No night sweats. No lymphadenopathy. He is working on improved diet choices.   He does have poor dentition. He has had several broken teeth. He has meet with an oral surgeon recommendation was for implants, this is not his preference.     Review of Systems:  A review of systems has been completed and are negative for complaints except what is stated in the HPI and/or past medical history    Allergies:  NKDA    Medications:  Medication list reviewed with patient and updated in EMR    Past Medical History:  Hypercholesterolemia, hypertension, chronic lymphocytic leukemia of B-cell type not having achieved remission    Social History:  He used to smoke but quit in . He lives by himself. He is single.   Retired .     Vital Signs:  /75 (BP Location: Left arm, Patient Position: Sitting, BP Cuff Size: Large adult long)   Pulse 53   Temp 36.1 °C (97 °F) (Temporal)   Resp 16   Ht (S) 1.735 m (5' 8.31\")   Wt 130 kg (286 lb 4.3 oz)   SpO2 95%   BMI 43.14 kg/m²       Physical Exam:  ECO  Pain: 0  Constitutional: Well developed, awake/alert/oriented x3, no distress, alert and cooperative  Eyes: PER. sclera anicteric  ENMT: Oral mucosa moist  Respiratory/Thorax: " Breathing is non-labored. Lungs are clear to auscultation bilaterally. No adventitious breath sounds  Cardiovascular: S1-S2. Regular rate and rhythm. No murmurs, rubs, or gallops appreciated  Gastrointestinal: Abdomen soft nontender, nondistended, normal active bowel sounds.  Musculoskeletal: ROM intact, no joint swelling, normal strength  Extremities: normal extremities, no cyanosis, no edema, no clubbing  Lymphatics: no palpable lymphadenopathy   Neurologic: alert and oriented x3. Nonfocal exam. No myoclonus  Psychological: Pleasant, appropriate and easily engaged     Labs:  CBC date/time       WBC     HGB     HCT     PLT     Neut      13-Dec-2022 09:07   12.5(H) 15.8    44.5    206     N/A  20-Jun-2022 09:39   12.3(H) 15.2    43.1    159     N/A    June 20, 2023  WBC 13.9, HGB/HCT 15.0/43.3, ,000    January 17, 2024  WBC 14.3, hemoglobin 14.9, hematocrit 43.4, platelets 203,000  Absolute lymphocyte count 8.87    August 12, 2024  WBC 13.0, hemoglobin 14.4, hematocrit 41.3, platelets 195,000    Assessment:  CLL:  - counts remain stable. He is not requiring treatment at this juncture.  - he may have dental work as necessary. We discussed monitoring for s/s infection.     Plan:  - 6 month follow-up  - labs on return     SHARON Patterson-CNP

## 2024-10-30 DIAGNOSIS — I10 PRIMARY HYPERTENSION: ICD-10-CM

## 2024-10-30 DIAGNOSIS — E03.9 ACQUIRED HYPOTHYROIDISM: ICD-10-CM

## 2024-10-30 RX ORDER — LEVOTHYROXINE SODIUM 100 UG/1
100 TABLET ORAL DAILY
Qty: 90 TABLET | Refills: 1 | Status: SHIPPED | OUTPATIENT
Start: 2024-10-30

## 2024-10-30 RX ORDER — ATENOLOL 50 MG/1
50 TABLET ORAL DAILY
Qty: 90 TABLET | Refills: 1 | OUTPATIENT
Start: 2024-10-30

## 2024-10-30 RX ORDER — ATENOLOL 50 MG/1
50 TABLET ORAL DAILY
Qty: 90 TABLET | Refills: 1 | Status: SHIPPED | OUTPATIENT
Start: 2024-10-30

## 2024-10-30 RX ORDER — LEVOTHYROXINE SODIUM 100 UG/1
100 TABLET ORAL DAILY
Qty: 90 TABLET | Refills: 1 | OUTPATIENT
Start: 2024-10-30

## 2025-01-22 ENCOUNTER — APPOINTMENT (OUTPATIENT)
Dept: SLEEP MEDICINE | Facility: CLINIC | Age: 75
End: 2025-01-22
Payer: COMMERCIAL

## 2025-01-28 ENCOUNTER — TELEPHONE (OUTPATIENT)
Dept: HEMATOLOGY/ONCOLOGY | Facility: CLINIC | Age: 75
End: 2025-01-28
Payer: COMMERCIAL

## 2025-01-28 NOTE — TELEPHONE ENCOUNTER
Message left with Silvino to have ;abs drawn prior to his appt if able to allow his provider to review at his appt.

## 2025-01-30 DIAGNOSIS — I10 PRIMARY HYPERTENSION: ICD-10-CM

## 2025-01-30 DIAGNOSIS — E03.9 ACQUIRED HYPOTHYROIDISM: ICD-10-CM

## 2025-01-30 RX ORDER — LEVOTHYROXINE SODIUM 100 UG/1
100 TABLET ORAL DAILY
Qty: 90 TABLET | Refills: 3 | Status: SHIPPED | OUTPATIENT
Start: 2025-01-30

## 2025-01-30 RX ORDER — ATENOLOL 50 MG/1
50 TABLET ORAL DAILY
Qty: 90 TABLET | Refills: 3 | Status: SHIPPED | OUTPATIENT
Start: 2025-01-30

## 2025-01-31 ENCOUNTER — LAB (OUTPATIENT)
Dept: LAB | Facility: CLINIC | Age: 75
End: 2025-01-31
Payer: MEDICARE

## 2025-01-31 DIAGNOSIS — C91.10 CHRONIC LYMPHOCYTIC LEUKEMIA (MULTI): ICD-10-CM

## 2025-01-31 LAB
ALBUMIN SERPL BCP-MCNC: 4.5 G/DL (ref 3.4–5)
ALP SERPL-CCNC: 68 U/L (ref 33–136)
ALT SERPL W P-5'-P-CCNC: 31 U/L (ref 10–52)
ANION GAP SERPL CALC-SCNC: 13 MMOL/L (ref 10–20)
AST SERPL W P-5'-P-CCNC: 25 U/L (ref 9–39)
BASOPHILS # BLD MANUAL: 0.17 X10*3/UL (ref 0–0.1)
BASOPHILS NFR BLD MANUAL: 1 %
BILIRUB SERPL-MCNC: 0.8 MG/DL (ref 0–1.2)
BUN SERPL-MCNC: 24 MG/DL (ref 6–23)
CALCIUM SERPL-MCNC: 9.5 MG/DL (ref 8.6–10.3)
CHLORIDE SERPL-SCNC: 103 MMOL/L (ref 98–107)
CO2 SERPL-SCNC: 28 MMOL/L (ref 21–32)
CREAT SERPL-MCNC: 1.18 MG/DL (ref 0.5–1.3)
EGFRCR SERPLBLD CKD-EPI 2021: 64 ML/MIN/1.73M*2
EOSINOPHIL # BLD MANUAL: 0.17 X10*3/UL (ref 0–0.4)
EOSINOPHIL NFR BLD MANUAL: 1 %
ERYTHROCYTE [DISTWIDTH] IN BLOOD BY AUTOMATED COUNT: 13 % (ref 11.5–14.5)
GLUCOSE SERPL-MCNC: 103 MG/DL (ref 74–99)
HCT VFR BLD AUTO: 44.5 % (ref 41–52)
HGB BLD-MCNC: 15.4 G/DL (ref 13.5–17.5)
IMM GRANULOCYTES # BLD AUTO: 0.05 X10*3/UL (ref 0–0.5)
IMM GRANULOCYTES NFR BLD AUTO: 0.3 % (ref 0–0.9)
LDH SERPL L TO P-CCNC: 177 U/L (ref 84–246)
LYMPHOCYTES # BLD MANUAL: 9.69 X10*3/UL (ref 0.8–3)
LYMPHOCYTES NFR BLD MANUAL: 58 %
MCH RBC QN AUTO: 32.4 PG (ref 26–34)
MCHC RBC AUTO-ENTMCNC: 34.6 G/DL (ref 32–36)
MCV RBC AUTO: 94 FL (ref 80–100)
MONOCYTES # BLD MANUAL: 1 X10*3/UL (ref 0.05–0.8)
MONOCYTES NFR BLD MANUAL: 6 %
NEUTS SEG # BLD MANUAL: 5.68 X10*3/UL (ref 1.6–5)
NEUTS SEG NFR BLD MANUAL: 34 %
NRBC BLD-RTO: 0 /100 WBCS (ref 0–0)
PLATELET # BLD AUTO: 225 X10*3/UL (ref 150–450)
POTASSIUM SERPL-SCNC: 4.4 MMOL/L (ref 3.5–5.3)
PROT SERPL-MCNC: 7.3 G/DL (ref 6.4–8.2)
RBC # BLD AUTO: 4.75 X10*6/UL (ref 4.5–5.9)
RBC MORPH BLD: ABNORMAL
SODIUM SERPL-SCNC: 140 MMOL/L (ref 136–145)
TOTAL CELLS COUNTED BLD: 100
WBC # BLD AUTO: 16.7 X10*3/UL (ref 4.4–11.3)

## 2025-01-31 PROCEDURE — 85007 BL SMEAR W/DIFF WBC COUNT: CPT

## 2025-01-31 PROCEDURE — 83615 LACTATE (LD) (LDH) ENZYME: CPT

## 2025-01-31 PROCEDURE — 84075 ASSAY ALKALINE PHOSPHATASE: CPT

## 2025-01-31 PROCEDURE — 85027 COMPLETE CBC AUTOMATED: CPT

## 2025-01-31 PROCEDURE — 36415 COLL VENOUS BLD VENIPUNCTURE: CPT

## 2025-02-04 ENCOUNTER — OFFICE VISIT (OUTPATIENT)
Dept: HEMATOLOGY/ONCOLOGY | Facility: CLINIC | Age: 75
End: 2025-02-04
Payer: MEDICARE

## 2025-02-04 VITALS
BODY MASS INDEX: 43.02 KG/M2 | SYSTOLIC BLOOD PRESSURE: 149 MMHG | OXYGEN SATURATION: 94 % | WEIGHT: 285.5 LBS | TEMPERATURE: 96.3 F | DIASTOLIC BLOOD PRESSURE: 79 MMHG | RESPIRATION RATE: 18 BRPM | HEART RATE: 73 BPM

## 2025-02-04 DIAGNOSIS — C91.10 CHRONIC LYMPHOCYTIC LEUKEMIA (MULTI): Primary | ICD-10-CM

## 2025-02-04 DIAGNOSIS — I10 HYPERTENSION, UNSPECIFIED TYPE: ICD-10-CM

## 2025-02-04 PROCEDURE — 1126F AMNT PAIN NOTED NONE PRSNT: CPT | Performed by: NURSE PRACTITIONER

## 2025-02-04 PROCEDURE — 3078F DIAST BP <80 MM HG: CPT | Performed by: NURSE PRACTITIONER

## 2025-02-04 PROCEDURE — 99214 OFFICE O/P EST MOD 30 MIN: CPT | Performed by: NURSE PRACTITIONER

## 2025-02-04 PROCEDURE — 3077F SYST BP >= 140 MM HG: CPT | Performed by: NURSE PRACTITIONER

## 2025-02-04 PROCEDURE — 1159F MED LIST DOCD IN RCRD: CPT | Performed by: NURSE PRACTITIONER

## 2025-02-04 ASSESSMENT — ENCOUNTER SYMPTOMS
DIZZINESS: 0
NAUSEA: 0
DIAPHORESIS: 0
CHEST TIGHTNESS: 0
FEVER: 0
MYALGIAS: 0
WOUND: 0
LIGHT-HEADEDNESS: 0
PALPITATIONS: 0
HEMATOLOGIC/LYMPHATIC NEGATIVE: 1
HEADACHES: 0
CONSTIPATION: 0
EXTREMITY WEAKNESS: 0
ADENOPATHY: 0
ABDOMINAL DISTENTION: 0
UNEXPECTED WEIGHT CHANGE: 0
COUGH: 0
RESPIRATORY NEGATIVE: 1
ABDOMINAL PAIN: 0
FATIGUE: 1
BRUISES/BLEEDS EASILY: 0
GASTROINTESTINAL NEGATIVE: 1
SHORTNESS OF BREATH: 0
ARTHRALGIAS: 0
EYE PROBLEMS: 0
BACK PAIN: 0
LEG SWELLING: 0
CARDIOVASCULAR NEGATIVE: 1
TROUBLE SWALLOWING: 0
EYES NEGATIVE: 1
SORE THROAT: 0

## 2025-02-04 ASSESSMENT — PAIN SCALES - GENERAL: PAINLEVEL_OUTOF10: 0-NO PAIN

## 2025-02-04 NOTE — PROGRESS NOTES
"Patient here for follow up visit with Ree Meza for Dx of CLL  Patient here Alone. He ambulated with Cane unassisted     Medications and Allergies reviewed and reconciled this visit.    No concerns or complaints noted at this time.     Pt reports appetite is  \"too good\"  Pt denies wt loss, fever , chills. Or night sweats    Pt denies pain other than pain to knee  .     Pt shared he is concerned as he would like to have dental work done ie dentures vs implants and is worried it would affect his leukemia. He is aware that he can has procedure done , will be given prophylactic antibiotics and we will monitor his labs and he was encouraged to discuss with Ree today.     Follow up per  PA  request.    Pt without My Chart encouraged to call office with any questions  or concerns.      No barriers to education noted, patient agrees to current plan and verbalized understanding using teach back method.   "

## 2025-02-04 NOTE — PROGRESS NOTES
Patient ID: Silvino Jacobo is a 74 y.o. male.    Primary Care Provider: Mraie Malave, SHARON-CNP  Visit Type: Follow Up      Subjective    HPI  Patient had been referred by his primary care provider for an elevated white count of 23,000. Hemoglobin and platelets were normal. There was a predominance of lymphocytes. He was seen in our office initially in April 2017. At that time, his white count was 23,600. Flow cytometry showed a B cell population consistent with CLL. FISH studies showed normal results. The patient has not required any treatments.     Interval History:  He is seen today in follow up evaluation. He denies any fevers, chills or night sweats. No cough, chest pain or shortness of breath. No nausea or vomiting. No diarrhea or constipation. He has not had any unexplained weight loss. No night sweats. No lymphadenopathy. He is working on improved diet choices.   He does have poor dentition. He has had several broken teeth. He has meet with an oral surgeon and is considering dentures with implants.    Review of Systems   Constitutional:  Positive for fatigue. Negative for diaphoresis, fever and unexpected weight change.   HENT:   Negative for hearing loss, mouth sores, nosebleeds, sore throat and trouble swallowing.         Multiple dental issues   Eyes: Negative.  Negative for eye problems.   Respiratory: Negative.  Negative for chest tightness, cough and shortness of breath.    Cardiovascular: Negative.  Negative for chest pain, leg swelling and palpitations.   Gastrointestinal: Negative.  Negative for abdominal distention, abdominal pain, constipation and nausea.   Musculoskeletal:  Negative for arthralgias, back pain, gait problem and myalgias.   Skin: Negative.  Negative for itching, rash and wound.   Neurological:  Negative for dizziness, extremity weakness, gait problem, headaches and light-headedness.   Hematological: Negative.  Negative for adenopathy. Does not bruise/bleed easily.       Objective   BSA: 2.49 meters squared  /79 (BP Location: Left arm, Patient Position: Sitting, BP Cuff Size: Adult long)   Pulse 73   Temp 35.7 °C (96.3 °F) (Temporal)   Resp 18   Wt 129 kg (285 lb 7.9 oz)   SpO2 94%   BMI 43.02 kg/m²      has no past medical history on file.   has no past surgical history on file.  Family History   Problem Relation Name Age of Onset    Hypertension Mother      Heart disease Father           Silvino Jacobo  reports that he has never smoked. He has never been exposed to tobacco smoke. He has never used smokeless tobacco.  He  reports current alcohol use.  He  reports no history of drug use.    Physical Exam  Constitutional:       Appearance: He is obese.   HENT:      Mouth/Throat:      Comments: Poor dentition, multiple missing, broken and decayed teeth  Eyes:      Conjunctiva/sclera: Conjunctivae normal.      Pupils: Pupils are equal, round, and reactive to light.   Cardiovascular:      Rate and Rhythm: Normal rate and regular rhythm.      Pulses: Normal pulses.      Heart sounds: Normal heart sounds.   Pulmonary:      Effort: No respiratory distress.      Breath sounds: No stridor. No wheezing.   Abdominal:      General: There is no distension.      Palpations: Abdomen is soft.      Tenderness: There is no abdominal tenderness.   Musculoskeletal:         General: Swelling present. No tenderness.   Lymphadenopathy:      Cervical: No cervical adenopathy.   Skin:     Coloration: Skin is not pale.      Findings: No bruising.   Neurological:      Motor: No weakness.     WBC   Date/Time Value Ref Range Status   01/31/2025 11:43 AM 16.7 (H) 4.4 - 11.3 x10*3/uL Final   08/12/2024 01:42 PM 13.0 (H) 4.4 - 11.3 x10*3/uL Final   01/17/2024 01:10 PM 14.3 (H) 4.4 - 11.3 x10*3/uL Final     nRBC   Date Value Ref Range Status   01/31/2025 0.0 0.0 - 0.0 /100 WBCs Final   08/12/2024 0.0 0.0 - 0.0 /100 WBCs Final   01/17/2024 0.0 0.0 - 0.0 /100 WBCs Final     RBC   Date Value Ref Range  "Status   01/31/2025 4.75 4.50 - 5.90 x10*6/uL Final   08/12/2024 4.42 (L) 4.50 - 5.90 x10*6/uL Final   01/17/2024 4.70 4.50 - 5.90 x10*6/uL Final     Hemoglobin   Date Value Ref Range Status   01/31/2025 15.4 13.5 - 17.5 g/dL Final   08/12/2024 14.4 13.5 - 17.5 g/dL Final   01/17/2024 14.9 13.5 - 17.5 g/dL Final     Hematocrit   Date Value Ref Range Status   01/31/2025 44.5 41.0 - 52.0 % Final   08/12/2024 41.3 41.0 - 52.0 % Final   01/17/2024 43.4 41.0 - 52.0 % Final     MCV   Date/Time Value Ref Range Status   01/31/2025 11:43 AM 94 80 - 100 fL Final   08/12/2024 01:42 PM 93 80 - 100 fL Final   01/17/2024 01:10 PM 92 80 - 100 fL Final     MCH   Date/Time Value Ref Range Status   01/31/2025 11:43 AM 32.4 26.0 - 34.0 pg Final   08/12/2024 01:42 PM 32.6 26.0 - 34.0 pg Final   01/17/2024 01:10 PM 31.7 26.0 - 34.0 pg Final     MCHC   Date/Time Value Ref Range Status   01/31/2025 11:43 AM 34.6 32.0 - 36.0 g/dL Final   08/12/2024 01:42 PM 34.9 32.0 - 36.0 g/dL Final   01/17/2024 01:10 PM 34.3 32.0 - 36.0 g/dL Final     RDW   Date/Time Value Ref Range Status   01/31/2025 11:43 AM 13.0 11.5 - 14.5 % Final   08/12/2024 01:42 PM 13.2 11.5 - 14.5 % Final   01/17/2024 01:10 PM 13.2 11.5 - 14.5 % Final     Platelets   Date/Time Value Ref Range Status   01/31/2025 11:43  150 - 450 x10*3/uL Final   08/12/2024 01:42  150 - 450 x10*3/uL Final   01/17/2024 01:10  150 - 450 x10*3/uL Final     MPV   Date/Time Value Ref Range Status   06/20/2023 01:17 PM 10.2 7.0 - 12.6 CU Final   04/19/2023 09:34 AM 10.9 7.0 - 12.6 CU Final   04/13/2022 09:42 AM 10.7 7.0 - 12.6 CU Final     No results found for: \"NEUTOPHILPCT\"  Immature Granulocytes %, Automated   Date/Time Value Ref Range Status   01/31/2025 11:43 AM 0.3 0.0 - 0.9 % Final     Comment:     Immature Granulocyte Count (IG) includes promyelocytes, myelocytes and metamyelocytes but does not include bands. Percent differential counts (%) should be interpreted in the " context of the absolute cell counts (cells/UL).   08/12/2024 01:42 PM 0.3 0.0 - 0.9 % Final     Comment:     Immature Granulocyte Count (IG) includes promyelocytes, myelocytes and metamyelocytes but does not include bands. Percent differential counts (%) should be interpreted in the context of the absolute cell counts (cells/UL).   01/17/2024 01:10 PM 0.3 0.0 - 0.9 % Final     Comment:     Immature Granulocyte Count (IG) includes promyelocytes, myelocytes and metamyelocytes but does not include bands. Percent differential counts (%) should be interpreted in the context of the absolute cell counts (cells/UL).     Lymphocytes %, Manual   Date/Time Value Ref Range Status   01/31/2025 11:43 AM 58.0 13.0 - 44.0 % Final   08/12/2024 01:42 PM 53.0 13.0 - 44.0 % Final     Comment:     The WBC differential count may be inaccurate due to cellular degeneration   01/17/2024 01:10 PM 62.0 13.0 - 44.0 % Final     Monocytes %, Manual   Date/Time Value Ref Range Status   01/31/2025 11:43 AM 6.0 2.0 - 10.0 % Final   08/12/2024 01:42 PM 3.0 2.0 - 10.0 % Final   01/17/2024 01:10 PM 5.0 2.0 - 10.0 % Final     Eosinophils %, Manual   Date/Time Value Ref Range Status   01/31/2025 11:43 AM 1.0 0.0 - 6.0 % Final   08/12/2024 01:42 PM 0.0 0.0 - 6.0 % Final   01/17/2024 01:10 PM 2.0 0.0 - 6.0 % Final     Basophils %, Manual   Date/Time Value Ref Range Status   01/31/2025 11:43 AM 1.0 0.0 - 2.0 % Final   08/12/2024 01:42 PM 1.0 0.0 - 2.0 % Final   01/17/2024 01:10 PM 0.0 0.0 - 2.0 % Final     Neutrophils Absolute   Date/Time Value Ref Range Status   06/20/2023 01:17 PM 5.00 1.8 - 7.7 K/UL Final   11/19/2021 12:28 PM 5.44 1.8 - 7.7 K/UL Final   05/17/2021 10:07 AM 5.24 1.8 - 7.7 K/UL Final     Immature Granulocytes Absolute, Automated   Date/Time Value Ref Range Status   01/31/2025 11:43 AM 0.05 0.00 - 0.50 x10*3/uL Final   08/12/2024 01:42 PM 0.04 0.00 - 0.50 x10*3/uL Final   01/17/2024 01:10 PM 0.04 0.00 - 0.50 x10*3/uL Final     Lymphocytes  Absolute   Date/Time Value Ref Range Status   06/20/2023 01:17 PM 7.92 (H) 1.2 - 3.2 K/UL Final   11/19/2021 12:28 PM 7.63 (H) 1.2 - 3.2 K/UL Final   05/17/2021 10:07 AM 8.18 (H) 1.2 - 3.2 K/UL Final     Monocytes Absolute   Date/Time Value Ref Range Status   06/20/2023 01:17 PM 0.70 0 - 0.8 K/UL Final   11/19/2021 12:28 PM 0.64 0 - 0.8 K/UL Final   05/17/2021 10:07 AM 0.68 0 - 0.8 K/UL Final     Eosinophils Absolute, Manual   Date/Time Value Ref Range Status   01/31/2025 11:43 AM 0.17 0.00 - 0.40 x10*3/uL Final   08/12/2024 01:42 PM 0.00 0.00 - 0.40 x10*3/uL Final   01/17/2024 01:10 PM 0.29 0.00 - 0.40 x10*3/uL Final     Basophils Absolute, Manual   Date/Time Value Ref Range Status   01/31/2025 11:43 AM 0.17 (H) 0.00 - 0.10 x10*3/uL Final   08/12/2024 01:42 PM 0.13 (H) 0.00 - 0.10 x10*3/uL Final   01/17/2024 01:10 PM 0.00 0.00 - 0.10 x10*3/uL Final         Assessment/Plan    CLL:  - counts remain stable. He is not requiring treatment at this juncture.  - he may have dental work as necessary. We discussed monitoring for s/s infection.      Plan:  - 6 month follow-up  - labs on return         Diagnoses and all orders for this visit:  Chronic lymphocytic leukemia (Multi)  -     CBC and Auto Differential; Future  -     Comprehensive Metabolic Panel; Future  -     Lactate Dehydrogenase; Future  -     Clinic Appointment Request Follow up  -     CBC and Auto Differential; Future  -     Comprehensive Metabolic Panel; Future  -     Lactate Dehydrogenase; Future  -     Clinic Appointment Request Follow up; Future  Hypertension, unspecified type           Ree Meza PA-C

## 2025-03-10 ENCOUNTER — APPOINTMENT (OUTPATIENT)
Dept: SLEEP MEDICINE | Facility: CLINIC | Age: 75
End: 2025-03-10
Payer: COMMERCIAL

## 2025-03-10 VITALS
DIASTOLIC BLOOD PRESSURE: 90 MMHG | WEIGHT: 285 LBS | OXYGEN SATURATION: 97 % | HEART RATE: 56 BPM | HEIGHT: 71 IN | BODY MASS INDEX: 39.9 KG/M2 | SYSTOLIC BLOOD PRESSURE: 136 MMHG

## 2025-03-10 DIAGNOSIS — G47.33 OBSTRUCTIVE SLEEP APNEA (ADULT) (PEDIATRIC): Primary | ICD-10-CM

## 2025-03-10 DIAGNOSIS — G47.61 PERIODIC LIMB MOVEMENT: ICD-10-CM

## 2025-03-10 PROCEDURE — 1036F TOBACCO NON-USER: CPT | Performed by: INTERNAL MEDICINE

## 2025-03-10 PROCEDURE — 3080F DIAST BP >= 90 MM HG: CPT | Performed by: INTERNAL MEDICINE

## 2025-03-10 PROCEDURE — 1159F MED LIST DOCD IN RCRD: CPT | Performed by: INTERNAL MEDICINE

## 2025-03-10 PROCEDURE — 1160F RVW MEDS BY RX/DR IN RCRD: CPT | Performed by: INTERNAL MEDICINE

## 2025-03-10 PROCEDURE — G2211 COMPLEX E/M VISIT ADD ON: HCPCS | Performed by: INTERNAL MEDICINE

## 2025-03-10 PROCEDURE — 99214 OFFICE O/P EST MOD 30 MIN: CPT | Performed by: INTERNAL MEDICINE

## 2025-03-10 PROCEDURE — 1126F AMNT PAIN NOTED NONE PRSNT: CPT | Performed by: INTERNAL MEDICINE

## 2025-03-10 PROCEDURE — 3075F SYST BP GE 130 - 139MM HG: CPT | Performed by: INTERNAL MEDICINE

## 2025-03-10 ASSESSMENT — SLEEP AND FATIGUE QUESTIONNAIRES
HOW LIKELY ARE YOU TO NOD OFF OR FALL ASLEEP WHILE SITTING AND TALKING TO SOMEONE: WOULD NEVER DOZE
HOW LIKELY ARE YOU TO NOD OFF OR FALL ASLEEP IN A CAR, WHILE STOPPED FOR A FEW MINUTES IN TRAFFIC: WOULD NEVER DOZE
HOW LIKELY ARE YOU TO NOD OFF OR FALL ASLEEP WHILE SITTING QUIETLY AFTER LUNCH WITHOUT ALCOHOL: WOULD NEVER DOZE
SITING INACTIVE IN A PUBLIC PLACE LIKE A CLASS ROOM OR A MOVIE THEATER: WOULD NEVER DOZE
HOW LIKELY ARE YOU TO NOD OFF OR FALL ASLEEP WHEN YOU ARE A PASSENGER IN A CAR FOR AN HOUR WITHOUT A BREAK: WOULD NEVER DOZE
HOW LIKELY ARE YOU TO NOD OFF OR FALL ASLEEP WHILE SITTING AND READING: SLIGHT CHANCE OF DOZING
HOW LIKELY ARE YOU TO NOD OFF OR FALL ASLEEP WHILE LYING DOWN TO REST IN THE AFTERNOON WHEN CIRCUMSTANCES PERMIT: MODERATE CHANCE OF DOZING
ESS-CHAD TOTAL SCORE: 4
HOW LIKELY ARE YOU TO NOD OFF OR FALL ASLEEP WHILE WATCHING TV: SLIGHT CHANCE OF DOZING

## 2025-03-10 ASSESSMENT — PAIN SCALES - GENERAL: PAINLEVEL_OUTOF10: 0-NO PAIN

## 2025-03-10 NOTE — ASSESSMENT & PLAN NOTE
Silvino   has sleep apnea and requires treatment.  Silvino reports that his equipment is not working properly.  Silvino 's current CPAP is broken beyond repair.   Motor has exceeded life expectancy  Silvino demonstrates previous good compliance and benefit from PAP therapy   Silvino is a REPAP and needs a replacement with remote monitoring capabilities.  Will order sleep study if deemed necessary and order CPAP 12 cmH2O therapy through [facility], and bring him back for 31-90 day compliance  If sleep study is not required, then we will order PAP therapy through [facility],  and bring him back for 31-90 day compliance

## 2025-03-10 NOTE — PROGRESS NOTES
"     Patient: Silvino Jacobo    [id number]  : 1950 -- AGE 74 y.o.    Provider: Raúl Chung MD     Location [facility]   Service Date: 3/10/2025              [facility]  Followup Visit Note    Subjective   Patient ID: Silvino Jacobo is a 74 y.o. male who presents for Sleep Apnea and Pap Adherence Followup.  HPI    Prior Sleep History:  PSG 2017: AHI 81    Current Sleep History:  Silvino presents for follow-up on the management of his sleep apnea which is currently being managed with positive airway pressure therapy.   The patient's current CPAP is broken beyond repair. Silvino Jacoob needs a replacement with remote monitoring capabilities. He reports that the machine is making unusual noises and it changes pitch as he is inhaling or exhaling    A downloaded compliance report was reviewed and was interpreted by myself as follows:  > 4 hour compliance was 100 %, with an average use of 6 hours and 30 minutes, with a residual AHI 4.3 on CPAP 12 cmH2o .     Silvino Jacobo reports good benefit from his device.    ESS: 4     Review of Systems  Review of systems negative except as per HPI  Objective   /90   Pulse 56   Ht 1.803 m (5' 11\")   Wt 129 kg (285 lb)   SpO2 97%   BMI 39.75 kg/m²    PREVIOUS WEIGHTS:  Wt Readings from Last 3 Encounters:   03/10/25 129 kg (285 lb)   25 129 kg (285 lb 7.9 oz)   24 130 kg (286 lb 4.3 oz)       Physical Exam  PHYSICAL EXAM: GENERAL: alert pleasant and cooperative no acute distress  PSYCH EXAM: alert,oriented, in NAD with a full range of affect, normal behavior and no psychotic features    Lab Results   Component Value Date    IRON 106 2024    TIBC 314 2024    FERRITIN 241 2024        Assessment/Plan   Problem List Items Addressed This Visit             ICD-10-CM    Obstructive sleep apnea (adult) (pediatric) - Primary G47.33     Silvino   has sleep apnea and requires " treatment.  Silvino reports that his equipment is not working properly.  Silvino 's current CPAP is broken beyond repair.   Motor has exceeded life expectancy  Silvino demonstrates previous good compliance and benefit from PAP therapy   Silvino is a REPAP and needs a replacement with remote monitoring capabilities.  Will order sleep study if deemed necessary and order CPAP 12 cmH2O therapy through [facility], and bring him back for 31-90 day compliance  If sleep study is not required, then we will order PAP therapy through [facility],  and bring him back for 31-90 day compliance          Relevant Orders    Positive Airway Pressure (PAP) Therapy    Follow Up In Adult Sleep Medicine    Periodic limb movement G47.61     Currently not an issue

## 2025-04-14 DIAGNOSIS — E78.2 MIXED HYPERLIPIDEMIA: ICD-10-CM

## 2025-04-14 RX ORDER — PRAVASTATIN SODIUM 80 MG/1
80 TABLET ORAL DAILY
Qty: 90 TABLET | Refills: 3 | Status: SHIPPED | OUTPATIENT
Start: 2025-04-14

## 2025-04-14 NOTE — TELEPHONE ENCOUNTER
LV 4/24/24, [place] 4/30/25    Pravastatin 80 mg    [facility] [address], [place]

## 2025-04-30 ENCOUNTER — OFFICE VISIT (OUTPATIENT)
Dept: PRIMARY CARE | Facility: CLINIC | Age: 75
End: 2025-04-30
Payer: MEDICARE

## 2025-04-30 VITALS
HEIGHT: 71 IN | WEIGHT: 290 LBS | OXYGEN SATURATION: 95 % | BODY MASS INDEX: 40.6 KG/M2 | DIASTOLIC BLOOD PRESSURE: 84 MMHG | HEART RATE: 58 BPM | SYSTOLIC BLOOD PRESSURE: 138 MMHG | TEMPERATURE: 97.2 F

## 2025-04-30 DIAGNOSIS — E03.9 ACQUIRED HYPOTHYROIDISM: ICD-10-CM

## 2025-04-30 DIAGNOSIS — R53.81 PHYSICAL DECONDITIONING: ICD-10-CM

## 2025-04-30 DIAGNOSIS — E55.9 VITAMIN D DEFICIENCY: ICD-10-CM

## 2025-04-30 DIAGNOSIS — G47.33 OBSTRUCTIVE SLEEP APNEA (ADULT) (PEDIATRIC): ICD-10-CM

## 2025-04-30 DIAGNOSIS — Z12.5 ENCOUNTER FOR PROSTATE CANCER SCREENING: ICD-10-CM

## 2025-04-30 DIAGNOSIS — I10 PRIMARY HYPERTENSION: ICD-10-CM

## 2025-04-30 DIAGNOSIS — E78.2 MIXED HYPERLIPIDEMIA: ICD-10-CM

## 2025-04-30 DIAGNOSIS — R73.9 HYPERGLYCEMIA: ICD-10-CM

## 2025-04-30 DIAGNOSIS — C91.10 CHRONIC LYMPHOCYTIC LEUKEMIA (MULTI): ICD-10-CM

## 2025-04-30 DIAGNOSIS — Z00.00 ENCOUNTER FOR ANNUAL WELLNESS EXAM IN MEDICARE PATIENT: Primary | ICD-10-CM

## 2025-04-30 PROBLEM — E66.01 MORBID OBESITY DUE TO EXCESS CALORIES (MULTI): Status: RESOLVED | Noted: 2023-09-10 | Resolved: 2025-04-30

## 2025-04-30 PROCEDURE — 1036F TOBACCO NON-USER: CPT | Performed by: NURSE PRACTITIONER

## 2025-04-30 PROCEDURE — 99215 OFFICE O/P EST HI 40 MIN: CPT | Performed by: NURSE PRACTITIONER

## 2025-04-30 PROCEDURE — 3079F DIAST BP 80-89 MM HG: CPT | Performed by: NURSE PRACTITIONER

## 2025-04-30 PROCEDURE — 1159F MED LIST DOCD IN RCRD: CPT | Performed by: NURSE PRACTITIONER

## 2025-04-30 PROCEDURE — 1125F AMNT PAIN NOTED PAIN PRSNT: CPT | Performed by: NURSE PRACTITIONER

## 2025-04-30 PROCEDURE — 1123F ACP DISCUSS/DSCN MKR DOCD: CPT | Performed by: NURSE PRACTITIONER

## 2025-04-30 PROCEDURE — 3075F SYST BP GE 130 - 139MM HG: CPT | Performed by: NURSE PRACTITIONER

## 2025-04-30 PROCEDURE — 1160F RVW MEDS BY RX/DR IN RCRD: CPT | Performed by: NURSE PRACTITIONER

## 2025-04-30 PROCEDURE — G0439 PPPS, SUBSEQ VISIT: HCPCS | Performed by: NURSE PRACTITIONER

## 2025-04-30 PROCEDURE — 1158F ADVNC CARE PLAN TLK DOCD: CPT | Performed by: NURSE PRACTITIONER

## 2025-04-30 RX ORDER — FUROSEMIDE 20 MG/1
20 TABLET ORAL DAILY
Qty: 90 TABLET | Refills: 3 | Status: SHIPPED | OUTPATIENT
Start: 2025-04-30

## 2025-04-30 ASSESSMENT — ENCOUNTER SYMPTOMS
POLYPHAGIA: 0
COUGH: 0
CHILLS: 0
CHEST TIGHTNESS: 0
BRUISES/BLEEDS EASILY: 0
SHORTNESS OF BREATH: 0
NAUSEA: 0
FEVER: 0
DIAPHORESIS: 0
LOSS OF SENSATION IN FEET: 0
WOUND: 0
POLYDIPSIA: 0
VOMITING: 0
DEPRESSION: 0
ABDOMINAL PAIN: 0
ADENOPATHY: 0
NECK PAIN: 0
BLOOD IN STOOL: 0
OCCASIONAL FEELINGS OF UNSTEADINESS: 1
BACK PAIN: 0
DYSURIA: 0
FLANK PAIN: 0
FACIAL ASYMMETRY: 0
HEMATURIA: 0
AGITATION: 0
DIZZINESS: 0
CONFUSION: 0
HEADACHES: 0
PALPITATIONS: 0
SPEECH DIFFICULTY: 0
FATIGUE: 0
SEIZURES: 0

## 2025-04-30 ASSESSMENT — LIFESTYLE VARIABLES
HOW MANY STANDARD DRINKS CONTAINING ALCOHOL DO YOU HAVE ON A TYPICAL DAY: PATIENT DOES NOT DRINK
HOW OFTEN DURING THE LAST YEAR HAVE YOU NEEDED AN ALCOHOLIC DRINK FIRST THING IN THE MORNING TO GET YOURSELF GOING AFTER A NIGHT OF HEAVY DRINKING: NEVER
HOW OFTEN DURING THE LAST YEAR HAVE YOU FAILED TO DO WHAT WAS NORMALLY EXPECTED FROM YOU BECAUSE OF DRINKING: NEVER
AUDIT-C TOTAL SCORE: 0
HOW OFTEN DURING THE LAST YEAR HAVE YOU HAD A FEELING OF GUILT OR REMORSE AFTER DRINKING: NEVER
HOW OFTEN DO YOU HAVE SIX OR MORE DRINKS ON ONE OCCASION: NEVER
SKIP TO QUESTIONS 9-10: 1
HOW OFTEN DURING THE LAST YEAR HAVE YOU BEEN UNABLE TO REMEMBER WHAT HAPPENED THE NIGHT BEFORE BECAUSE YOU HAD BEEN DRINKING: NEVER
HAVE YOU OR SOMEONE ELSE BEEN INJURED AS A RESULT OF YOUR DRINKING: NO
AUDIT TOTAL SCORE: 0
HAS A RELATIVE, FRIEND, DOCTOR, OR ANOTHER HEALTH PROFESSIONAL EXPRESSED CONCERN ABOUT YOUR DRINKING OR SUGGESTED YOU CUT DOWN: NO
HOW OFTEN DO YOU HAVE A DRINK CONTAINING ALCOHOL: NEVER
HOW OFTEN DURING THE LAST YEAR HAVE YOU FOUND THAT YOU WERE NOT ABLE TO STOP DRINKING ONCE YOU HAD STARTED: NEVER

## 2025-04-30 ASSESSMENT — PATIENT HEALTH QUESTIONNAIRE - PHQ9
1. LITTLE INTEREST OR PLEASURE IN DOING THINGS: NOT AT ALL
2. FEELING DOWN, DEPRESSED OR HOPELESS: NOT AT ALL
SUM OF ALL RESPONSES TO PHQ9 QUESTIONS 1 AND 2: 0

## 2025-04-30 ASSESSMENT — PAIN SCALES - GENERAL: PAINLEVEL_OUTOF10: 8

## 2025-04-30 NOTE — PROGRESS NOTES
[facility]: [facility]  MEDICARE WELLNESS EXAM      Silvino Jacobo is a 74 y.o. male that is presenting today for Medicare Annual Wellness Exam.    He reports he will [facility] and was encouraged to see them annually 6 months after today's visit.    Encouraged Covid 19 and RSV immunizations via local pharmacy.    Mr. Jacobo reports taking antihypertensive as directed. He is trying to follow a low sodium diet. He is not monitoring home BP. He is followed by Cardiology. Denies CP, SOB, dizziness, syncope or HA. Admits to SOBE but attributes it to his weight and deconditioning.      Encouraged low calorie diet, weight management and regularly engage in aerobic activity. He is interested in Physical Therapy referral.    Reports tolerating statin. Trying to follow a low sodium diet. Denies statin related abdominal pain myalgias or arthralgias.    He reports taking levothyroxine as directed without food. Denies heat or cold intolerance, palpitations or unintentional weight changes.    He is followed by Sleep Medicine, Dr. Chung, for ZONIA. Reports compliance with CPAP.    He is followed by Hem/Onc, for CLL, Last seen 02/04/25.       Assessment/Plan      Diagnoses and all orders for this visit:    Encounter for annual wellness exam in Medicare patient        -     Routine and preventative care provided and discussed with patient    Primary hypertension  -     Acceptable control  -     Continue established follow up with Cardiology  -     atenolol 50 mg daily  -     CBC and Auto Differential; Future  -     Comprehensive Metabolic Panel; Future  -     Lipid Panel; Future  -     furosemide (Lasix) 20 mg tablet; Take 1 tablet (20 mg) by mouth once daily. as directed    Mixed hyperlipidemia  -     Tolerating statin  -     pravastatin 80 mg daily  -     ASCVD Risk 31.4%  -     Continue established follow up with Cardiology  -     Comprehensive Metabolic Panel; Future  -      Lipid Panel; Future    Chronic lymphocytic leukemia (Multi)  -     Continue established follow up with Hem/Onc  -     CBC and Auto Differential; Future  -     Comprehensive Metabolic Panel; Future    Acquired hypothyroidism  -     Clinically euthyroid  -     Levothyroxine 100 mcg daily  -     TSH with reflex to Free T4 if abnormal; Future    Vitamin D deficiency  -     Vitamin D 25-Hydroxy,Total (for eval of Vitamin D levels); Future  Continue daily OTC VitaminD supplement    Hyperglycemia  -     Hemoglobin A1C; Future    Encounter for prostate cancer screening  -     Prostate Specific Antigen; Future    Body mass index (BMI) 40.0-44.9, adult (Multi)  -     Referral to Physical Therapy; Future  -     Encouraged lifestyle modification efforts to include low calorie diet, weight management and regularly engage in aerobic activities.    Physical deconditioning  -     Referral to Physical Therapy; Future    Obstructive sleep apnea (adult) (pediatric)        -     Compliant with CPAP        -     Continue established follow up with Sleep Medicine    Other orders  -     Follow Up In Primary Care - Medicare Annual; Future    ADVANCED CARE PLANNING  Advanced Care Planning was discussed with patient:  The patient has an active surrogate decision-maker on file. The patient does not have an advanced care plan on file.  Encouraged the patient to confirm that Living Will and Healthcare Power of  (HCPoA) are accurate and up to date.  Encouraged the patient to confirm that our office be provided a copy of any documentation in the event that anything changes.    ACTIVITIES OF DAILY LIVING  Basic ADLs:  Bathing: Independent, Dressing: Independent, Toileting: Independent, Transferring: Independent, Continence: Independent, Feeding: Independent.    Instrumental ADLs:  Ability to use phone: Independent, Shopping: Independent, Cooking: Independent, House-keeping: Independent, Laundry: Independent, Transportation: Independent,  Medication Management: Independent, Finance Management: Independent.    Subjective   HPI  Review of Systems   Constitutional:  Negative for chills, diaphoresis, fatigue and fever.   HENT:  Negative for hearing loss and mouth sores.    Eyes:  Negative for visual disturbance.   Respiratory:  Negative for cough, chest tightness and shortness of breath.    Cardiovascular:  Negative for chest pain, palpitations and leg swelling.   Gastrointestinal:  Negative for abdominal pain, blood in stool, nausea and vomiting.   Endocrine: Negative for cold intolerance, heat intolerance, polydipsia, polyphagia and polyuria.   Genitourinary:  Negative for dysuria, flank pain and hematuria.   Musculoskeletal:  Negative for back pain and neck pain.   Skin:  Negative for rash and wound.   Allergic/Immunologic: Negative for environmental allergies, food allergies and immunocompromised state.   Neurological:  Negative for dizziness, seizures, syncope, facial asymmetry, speech difficulty and headaches.   Hematological:  Negative for adenopathy. Does not bruise/bleed easily.   Psychiatric/Behavioral:  Negative for agitation and confusion.      Objective   Vitals:    04/30/25 0816   BP: (!) 136/96   Pulse: 58   Temp: 36.2 °C (97.2 °F)   SpO2: 95%      Body mass index is 40.45 kg/m².  Physical Exam  Vitals and nursing note reviewed.   Constitutional:       General: He is not in acute distress.     Appearance: Normal appearance. He is not ill-appearing.   HENT:      Head: Normocephalic and atraumatic.      Right Ear: Tympanic membrane, ear canal and external ear normal. There is no impacted cerumen.      Left Ear: Tympanic membrane, ear canal and external ear normal. There is no impacted cerumen.      Nose: Nose normal.      Mouth/Throat:      Mouth: Mucous membranes are moist.      Pharynx: Oropharynx is clear. No oropharyngeal exudate or posterior oropharyngeal erythema.   Eyes:      General: No scleral icterus.        Right eye: No  discharge.         Left eye: No discharge.      Extraocular Movements: Extraocular movements intact.      Conjunctiva/sclera: Conjunctivae normal.      Pupils: Pupils are equal, round, and reactive to light.   Neck:      Vascular: No carotid bruit.   Cardiovascular:      Rate and Rhythm: Normal rate and regular rhythm.      Pulses: Normal pulses.      Heart sounds: Normal heart sounds. No murmur heard.  Pulmonary:      Effort: Pulmonary effort is normal. No respiratory distress.      Breath sounds: Normal breath sounds.   Abdominal:      General: Abdomen is flat. Bowel sounds are normal. There is no distension.      Palpations: Abdomen is soft. There is no mass.      Tenderness: There is no abdominal tenderness. There is no right CVA tenderness or left CVA tenderness.   Musculoskeletal:         General: Normal range of motion.      Cervical back: Normal range of motion. No tenderness.      Right lower leg: No edema.      Left lower leg: No edema.   Lymphadenopathy:      Cervical: No cervical adenopathy.   Skin:     General: Skin is warm and dry.      Coloration: Skin is not jaundiced.      Findings: No rash.   Neurological:      General: No focal deficit present.      Mental Status: He is alert and oriented to person, place, and time. Mental status is at baseline.   Psychiatric:         Mood and Affect: Mood normal.         Behavior: Behavior normal.       Diagnostic Results   Lab Results   Component Value Date    GLUCOSE 103 (H) 01/31/2025    CALCIUM 9.5 01/31/2025     01/31/2025    K 4.4 01/31/2025    CO2 28 01/31/2025     01/31/2025    BUN 24 (H) 01/31/2025    CREATININE 1.18 01/31/2025     Lab Results   Component Value Date    ALT 31 01/31/2025    AST 25 01/31/2025    ALKPHOS 68 01/31/2025    BILITOT 0.8 01/31/2025     Lab Results   Component Value Date    WBC 16.7 (H) 01/31/2025    HGB 15.4 01/31/2025    HCT 44.5 01/31/2025    MCV 94 01/31/2025     01/31/2025     Lab Results   Component Value  "Date    CHOL 145 04/24/2024    CHOL 210 (H) 04/19/2023    CHOL 151 04/13/2022     Lab Results   Component Value Date    HDL 35.0 (L) 04/24/2024    HDL 34 (L) 04/19/2023    HDL 35 (L) 04/13/2022     Lab Results   Component Value Date    LDLCALC 77 04/24/2024    LDLCALC 137 (H) 04/19/2023    LDLCALC 87 04/13/2022     Lab Results   Component Value Date    TRIG 165 (H) 04/24/2024    TRIG 194 (H) 04/19/2023    TRIG 147 04/13/2022     No components found for: \"CHOLHDL\"  Lab Results   Component Value Date    HGBA1C 6.3 (H) 04/24/2024     Other labs not included in the list above reviewed either before or during this encounter.    History   Medical History[1]  Surgical History[2]  Family History[3]  Social History     Socioeconomic History    Marital status: Single     Spouse name: Not on file    Number of children: Not on file    Years of education: Not on file    Highest education level: Not on file   Occupational History    Not on file   Tobacco Use    Smoking status: Never     Passive exposure: Never    Smokeless tobacco: Never   Vaping Use    Vaping status: Never Used   Substance and Sexual Activity    Alcohol use: Not Currently     Comment: ocassional    Drug use: Never    Sexual activity: Not on file   Other Topics Concern    Not on file   Social History Narrative    Not on file     Social Drivers of Health     Financial Resource Strain: Not on file   Food Insecurity: Not on file   Transportation Needs: Not on file   Physical Activity: Not on file   Stress: Not on file   Social Connections: Not on file   Intimate Partner Violence: Not on file   Housing Stability: Not on file     Allergies[4]  Medications Ordered Prior to Encounter[5]  Immunization History   Administered Date(s) Administered    COVID-19, mRNA, LNP-S, PF, 30 mcg/0.3 mL dose 03/04/2021, 04/01/2021, 12/23/2021    Flu vaccine (IIV4), preservative free *Check age/dose* 12/15/2022    Pfizer COVID-19 vaccine, 12 years and older, (30mcg/0.3mL) (Comirnaty) " 02/02/2024    Pfizer COVID-19 vaccine, bivalent, age 12 years and older (30 mcg/0.3 mL) 12/15/2022    Pneumococcal conjugate vaccine, 13-valent (PREVNAR 13) 05/22/2018    Pneumococcal conjugate vaccine, 20-valent (PREVNAR 20) 04/24/2024    Tdap vaccine, age 7 year and older (BOOSTRIX, ADACEL) 05/22/2018    Zoster vaccine, recombinant, adult (SHINGRIX) 05/14/2021, 07/19/2021     Patient's medical history was reviewed and updated either before or during this encounter.     Marie Malave, APRN-CNP       [1] History reviewed. No pertinent past medical history.  [2] History reviewed. No pertinent surgical history.  [3]   Family History  Problem Relation Name Age of Onset    Hypertension Mother      Heart disease Father     [4] No Known Allergies  [5]   Current Outpatient Medications on File Prior to Visit   Medication Sig Dispense Refill    atenolol (Tenormin) 50 mg tablet TAKE 1 TABLET BY MOUTH EVERY DAY 90 tablet 3    cholecalciferol, vitamin D3, (VITAMIN D3 ORAL) Take by mouth.      furosemide (Lasix) 20 mg tablet Take 1 tablet (20 mg) by mouth once daily. as directed      levothyroxine (Synthroid, Levoxyl) 100 mcg tablet TAKE 1 TABLET BY MOUTH EVERY DAY 90 tablet 3    MAGNESIUM ORAL Take by mouth once daily.      multivit-min/iron/folic acid/K (ADULTS MULTIVITAMIN ORAL) Take by mouth.      naproxen sodium (Aleve) 220 mg tablet Take 1 tablet (220 mg) by mouth every 12 hours if needed.      pravastatin (Pravachol) 80 mg tablet Take 1 tablet (80 mg) by mouth once daily. 90 tablet 3     No current facility-administered medications on file prior to visit.

## 2025-05-01 LAB
25(OH)D3+25(OH)D2 SERPL-MCNC: 57 NG/ML (ref 30–100)
ALBUMIN SERPL-MCNC: 4.5 G/DL (ref 3.6–5.1)
ALP SERPL-CCNC: 67 U/L (ref 35–144)
ALT SERPL-CCNC: 27 U/L (ref 9–46)
ANION GAP SERPL CALCULATED.4IONS-SCNC: 7 MMOL/L (CALC) (ref 7–17)
AST SERPL-CCNC: 22 U/L (ref 10–35)
BASOPHILS # BLD AUTO: 68 CELLS/UL (ref 0–200)
BASOPHILS NFR BLD AUTO: 0.5 %
BILIRUB SERPL-MCNC: 0.6 MG/DL (ref 0.2–1.2)
BUN SERPL-MCNC: 21 MG/DL (ref 7–25)
CALCIUM SERPL-MCNC: 9.3 MG/DL (ref 8.6–10.3)
CHLORIDE SERPL-SCNC: 104 MMOL/L (ref 98–110)
CHOLEST SERPL-MCNC: 147 MG/DL
CHOLEST/HDLC SERPL: 4.1 (CALC)
CO2 SERPL-SCNC: 30 MMOL/L (ref 20–32)
CREAT SERPL-MCNC: 1.09 MG/DL (ref 0.7–1.28)
EGFRCR SERPLBLD CKD-EPI 2021: 71 ML/MIN/1.73M2
EOSINOPHIL # BLD AUTO: 149 CELLS/UL (ref 15–500)
EOSINOPHIL NFR BLD AUTO: 1.1 %
ERYTHROCYTE [DISTWIDTH] IN BLOOD BY AUTOMATED COUNT: 12.6 % (ref 11–15)
EST. AVERAGE GLUCOSE BLD GHB EST-MCNC: 154 MG/DL
EST. AVERAGE GLUCOSE BLD GHB EST-SCNC: 8.5 MMOL/L
GLUCOSE SERPL-MCNC: 147 MG/DL (ref 65–99)
HBA1C MFR BLD: 7 %
HCT VFR BLD AUTO: 44 % (ref 38.5–50)
HDLC SERPL-MCNC: 36 MG/DL
HGB BLD-MCNC: 15 G/DL (ref 13.2–17.1)
LDLC SERPL CALC-MCNC: 85 MG/DL (CALC)
LYMPHOCYTES # BLD AUTO: 7439 CELLS/UL (ref 850–3900)
LYMPHOCYTES NFR BLD AUTO: 55.1 %
MCH RBC QN AUTO: 32.4 PG (ref 27–33)
MCHC RBC AUTO-ENTMCNC: 34.1 G/DL (ref 32–36)
MCV RBC AUTO: 95 FL (ref 80–100)
MONOCYTES # BLD AUTO: 581 CELLS/UL (ref 200–950)
MONOCYTES NFR BLD AUTO: 4.3 %
NEUTROPHILS # BLD AUTO: 5265 CELLS/UL (ref 1500–7800)
NEUTROPHILS NFR BLD AUTO: 39 %
NONHDLC SERPL-MCNC: 111 MG/DL (CALC)
PLATELET # BLD AUTO: 196 THOUSAND/UL (ref 140–400)
PMV BLD REES-ECKER: 10.4 FL (ref 7.5–12.5)
POTASSIUM SERPL-SCNC: 4.6 MMOL/L (ref 3.5–5.3)
PROT SERPL-MCNC: 6.9 G/DL (ref 6.1–8.1)
PSA SERPL-MCNC: 1.03 NG/ML
RBC # BLD AUTO: 4.63 MILLION/UL (ref 4.2–5.8)
SODIUM SERPL-SCNC: 141 MMOL/L (ref 135–146)
TRIGL SERPL-MCNC: 159 MG/DL
TSH SERPL-ACNC: 0.89 MIU/L (ref 0.4–4.5)
WBC # BLD AUTO: 13.5 THOUSAND/UL (ref 3.8–10.8)

## 2025-05-01 NOTE — RESULT ENCOUNTER NOTE
Elevated WBC (infection?), CMP elevated FBG consistent with A1c. Diabetic. Encourage lifestyle modification efforts to include low sugar and carbohydrate diet, weight management and regularly engage in aerobic activities. Further elevation will result in need for medication intervention. Lipid Panel with no significant abnormality. TSH, Vitamin D and PSA WNL.

## 2025-05-01 NOTE — RESULT ENCOUNTER NOTE
Pt states he is aware of elevated WBC count due to CLL, states he is seen @ [facility] informed of results, adives to increase LM to include low sugar, low carb diet and increase aerobic exercise and informed that further elevations in FBG would result in adding medication.  Copy of results and PCP recommendations mailed to pt.

## 2025-05-14 ENCOUNTER — EVALUATION (OUTPATIENT)
Dept: PHYSICAL THERAPY | Facility: CLINIC | Age: 75
End: 2025-05-14
Payer: MEDICARE

## 2025-05-14 DIAGNOSIS — R26.2 DIFFICULTY WALKING: Primary | ICD-10-CM

## 2025-05-14 DIAGNOSIS — R53.81 PHYSICAL DECONDITIONING: ICD-10-CM

## 2025-05-14 PROCEDURE — 97162 PT EVAL MOD COMPLEX 30 MIN: CPT | Mod: GP | Performed by: PHYSICAL THERAPIST

## 2025-05-14 PROCEDURE — 97110 THERAPEUTIC EXERCISES: CPT | Mod: GP | Performed by: PHYSICAL THERAPIST

## 2025-05-14 ASSESSMENT — ENCOUNTER SYMPTOMS
LOSS OF SENSATION IN FEET: 0
OCCASIONAL FEELINGS OF UNSTEADINESS: 1
DEPRESSION: 0

## 2025-05-14 NOTE — PROGRESS NOTES
Physical Therapy  Physical Therapy Orthopedic Evaluation    Patient Name: Silvino Jacobo  MRN: [id number]  Today's Date: 5/14/2025  Time Calculation  Start Time: 1445  Stop Time: 1535  Time Calculation (min): 50 min  PT Evaluation Time Entry  PT Evaluation (Complex) Time Entry: 40, PT Therapeutic Procedures Time Entry  Therapeutic Exercise Time Entry: 8,      Insurance:  Payor: MEDICARE / Plan: MEDICARE PART A AND B / Product Type: *No Product type* /   Number of Treatments Authorized: 1/?  Certification Period Start Date: 05/15/25  Certification Period End Date: 08/12/25    Current Problem  1. Difficulty walking  Follow Up In Physical Therapy      2. Body mass index (BMI) 40.0-44.9, adult (Multi)  Referral to Physical Therapy    Follow Up In Physical Therapy      3. Physical deconditioning  Referral to Physical Therapy    Follow Up In Physical Therapy          General:  General  Reason for Referral: Deconditioned, Knee OA, Balance  Referred By: JIMMIE Casanova  Past Medical History Relevant to Rehab: HTN, OA,Chronic Lymphocytic leukemia, vascular disease,Abdominal aortic aneurysm w/o rupture.    Precautions:   Precautions  STEADI Fall Risk Score (The score of 4 or more indicates an increased risk of falling): 5    Medical History Form: Reviewed (scanned into chart)    Subjective:   Subjective :  Patient centered goal: improve balance, improve  R knee function, improve balance and return to golf.    Pain:   7/10    (PMH & Previous Tests/Imaging):   2013 X-Ray  Narrowing of medial compartment and patellofemoral compartment. Osteophyte  formation medial plateau, and medial femoral condyle. No fracture. No bone  lesion.  Previous Interventions/Treatments: None    Prior Level of Function (PLOF)  Prior Function Per Pt/Caregiver Report  Level of [place]: Independent with ADLs and functional transfers, Independent with homemaking with ambulation  Patient previously independent with all ADLs    Patients  "Living Environment:   Home Living Comment: Lives in apartment on ground floor - avoids stairs    Primary Language: English    Red Flags: Do you have any of the following? No  Fever/chills, unexplained weight changes, dizziness/fainting, unexplained change in bowel or bladder functions, unexplained malaise or muscle weakness, night pain/sweats, numbness or tingling    Objective   Pitting edema at ankles bilaterally: R = 36.5 cm /  L = 35.0 cm  Posture:  Forward head, increased kyphosis, forward trunk, flexed hips and knees, varus knees,pes planus    Hip AROM  R hip flexion: (125°): 105  L hip flexion: (125°): 110  R hip abduction: (45°): 10  L hip abduction: (45°): 15  R hip extension: (10°): NT  L hip extension: (10°): NT  R hip ER: (45°): 30  L hip ER: (45°): 40  R hip IR: (45°): -5  L hip IR: (45°): 5  Specific Lower Extremity MMT   R Iliopsoas: (5/5): 4-/5  L Iliopsoas: (5/5): 4-/5  R Gluteals (prone): (5/5): 3-/5  L Gluteals (prone): (5/5): 3-/5  R Gluteals (sidelying): (5/5): 3-/5  L Gluteals (sidelying): (5/5): 3-/5  R knee flexion: (5/5): 4/5  L knee flexion: (5/5): 4/5  R knee extension: (5/5): 4/5  L knee extension: (5/5): 4/5  KNEE :  15 deg knee varus bilat  Knee AROM  R knee flexion: (140°): 98  L knee flexion: (140°): 106  R knee extension: (0°): -15  L knee extension: (0°): -10  ANKLE  Ankle AROM  R ankle dorsiflexion: (10°): 0  L ankle dorsiflexion: (10°): 0  Gait:   Walking with SPC - bilat Trendelenburg    Outcome Measures:    Other Measures  5x Sit to Stand: 27 sec  Activities - Specific Balance Confidence Scale: 990 (61.8%)  Lower Extremity Funtional Score (LEFS): 35 (43.7%)  Oswestry Disablity Index (SARAH): 14 (28%)  mCTSIb: 25,7,3,0 = 35/120 (poor foot/ankle support on foam)  OTW: 5\" (-1\" from wall)     EDUCATION: home exercise program, plan of care, activity modifications, pain management, and injury pathology  Outpatient Education  Individual(s) Educated: Patient  Education Provided: Anatomy, " Home Exercise Program  Education Comment: Access Code: [email or web address]  URL: [email or web address]  Date: 05/14/2025  Prepared by: Juan Manuel Branch    Exercises  - Sit to Stand with Counter Support  - 1 x daily - 7 x weekly - 2-3 sets - 5-10 reps  - Seated Quad Set  - 1 x daily - 7 x weekly - 2-3 sets - 5 reps - 5 hold    Assessment:  PT Assessment Results: Decreased strength, Decreased range of motion, Decreased endurance, Impaired balance, Decreased mobility, Obesity, Pain  Rehab Prognosis: Fair  Assessment Comment: Patient arrives with complaint of poor mobility and knee pain associated with OA and deconditioned status.  Patient  presents with significant ROM loss and weakness at hips and knees.  Walking with SPC for support. Will benefit from therapy to address identifited defifits towards functional goals.    Clinical Presentation: Evolving with changing characteristics  Personal Factors: BMI > 40    Plan:  Treatment/Interventions: Manual therapy, Neuromuscular re-education, Self care/ home management, Therapeutic activities, Therapeutic exercises  PT Plan: Skilled PT  PT Frequency: 2 times per week  Duration: 8 weeks  Onset Date: 01/01/25  Certification Period Start Date: 05/15/25  Certification Period End Date: 08/12/25  Number of Treatments Authorized: 1/?  Rehab Potential: Fair  Plan of Care Agreement: Patient    Goals: Set and discussed today  Active       PT Problem       PT Goal 1       Start:  05/15/25    Expected End:  08/12/25       STG  1) Patient will improve ABC score by 7% in order to perform functional activities at home and in the community in 4 weeks.  2) Patient will improve 5 x sit to stand to 15 seconds to demonstrate improved LE strength and ease of transfers in 4 weeks.   3) Patient will improve RIGHT knee ROM to -10 to 105 deg's for ease of transfers and to reduce pain during ADL's and for reduced fall risk in 4 weeks.    4) Patient will be independent with HEP to allow for  "continued improvement in daily tasks at home and in the community in 3weeks.   LTG  1) Patient will improve OTW standing to 3\" indicating improved hip and knee ROM and improved posture towards managing back pain in 8 weeks.  2) Patient will be able to perform step up exercises at 4-6 inches for ease of stair climbing and curb negotiation for ease of community access in 8 weeks.   3) Patient will improve LEFS  >/= 45/80 to indicate improved LE function and ability to demonstrate reduced fall risk within the community in 8 weeks.   4) Patient will improve mCTSIB to > 60/120 indicating improved balance of various surfaces and reduced fall risk in 8 weeks.    5) Patient will improve ABC score by 12% in order to perform functional activities at home and in the community in 8 weeks.              Plan of care was developed with input and agreement by the patient    Treatments:  Therapeutic Exercise  Therapeutic Exercise Activity 1: seated QS: 5 hold x 10  Therapeutic Exercise Activity 2: Sit to stand: 2 x 5      Juan Manuel Branch PT           "

## 2025-05-16 ENCOUNTER — TREATMENT (OUTPATIENT)
Dept: PHYSICAL THERAPY | Facility: CLINIC | Age: 75
End: 2025-05-16
Payer: MEDICARE

## 2025-05-16 DIAGNOSIS — R53.81 PHYSICAL DECONDITIONING: ICD-10-CM

## 2025-05-16 DIAGNOSIS — R26.2 DIFFICULTY WALKING: Primary | ICD-10-CM

## 2025-05-16 PROCEDURE — 97140 MANUAL THERAPY 1/> REGIONS: CPT | Mod: GP,CQ

## 2025-05-16 PROCEDURE — 97110 THERAPEUTIC EXERCISES: CPT | Mod: GP,CQ

## 2025-05-16 ASSESSMENT — PAIN SCALES - GENERAL: PAINLEVEL_OUTOF10: 6

## 2025-05-16 ASSESSMENT — PAIN - FUNCTIONAL ASSESSMENT: PAIN_FUNCTIONAL_ASSESSMENT: 0-10

## 2025-05-16 NOTE — PROGRESS NOTES
"Physical Therapy Treatment    Patient Name: Silvino Jacobo  MRN: [id number]  Today's Date: 5/16/2025    Current Problem  Problem List Items Addressed This Visit           ICD-10-CM    Physical deconditioning R53.81    Difficulty walking - Primary R26.2     Other Visit Diagnoses         Codes      Body mass index (BMI) 40.0-44.9, adult (Multi)     Z68.41            Insurance:  Payor: MEDICARE / Plan: MEDICARE PART A AND B / Product Type: *No Product type* /   Number of Treatments Authorized: 2/?  Certification Period Start Date: 05/15/25  Certification Period End Date: 08/12/25    Subjective   General  Reason for Referral: Deconditioned, Knee OA, Balance  Referred By: Marie Malave, APRN-CNP  Past Medical History Relevant to Rehab: HTN, OA,Chronic Lymphocytic leukemia, vascular disease,Abdominal aortic aneurysm w/o rupture.  General Comment: PT STATES HE DID THE HEP AND HE BECAME SORE FROM THEM.  HE DOES FEEL A LITTLE MORE STABLE ALREADY.    Performing HEP?: Yes    Precautions  Precautions  STEADI Fall Risk Score (The score of 4 or more indicates an increased risk of falling): 5  Precautions Comment: HTN, OA,Chronic Lymphocytic leukemia, vascular disease,Abdominal aortic aneurysm w/o rupture.  Pain  Pain Assessment: 0-10  0-10 (Numeric) Pain Score: 6  Pain Location: Back (HIPS, KNEES)    Objective       General Observation  General Observation: FWD POSTURE USING CANE    Treatments:    Therapeutic Exercise  Therapeutic Exercise Activity 1: SUPINE SBALL DKTC X 2 MIN  Therapeutic Exercise Activity 2: SUPINE SBALL LB ROT X 2 MIN  Therapeutic Exercise Activity 3: SLR R/L X 1 MIN EACH  Therapeutic Exercise Activity 4: HL HIP ADD HOLD 10\" X 2 MIN  Therapeutic Exercise Activity 5: HL HIP ABD R/L ALT GREEN TBAND X 2 MIN  Therapeutic Exercise Activity 6: BRIDGE HOLD 5\" X 2 MIN  Therapeutic Exercise Activity 7: GASTROC STRETCH X 1 MIN  Therapeutic Exercise Activity 8: HEEL RAISES X 10  Therapeutic Exercise Activity 9: SIT TO " STAND 2 BLUE PADS MINIMAL HAND PUSH OFF X 10    Balance/Neuromuscular Re-Education  Balance/Neuromuscular Re-Education Activity 1: AIREX BEAM BALANCING // BAR X 2 MIN  Balance/Neuromuscular Re-Education Activity 2: AIREX BEAM SIDE STEP // BAR X 2 MIN    Manual Therapy  Manual Therapy Activity 1: MFR R/L LEG PULL  Manual Therapy Activity 2: STRETCH HAM, QUAD, GLUT, HIP FLEX  Manual Therapy Activity 3: PROM BOTH KNEE FLEX, EXT;   HIP FLEX, ER, IR                        OP EDUCATION:  Outpatient Education  Education Comment: CONTINUE WITH CURRENT HEP    Assessment:  PT Assessment  Assessment Comment: PT STEFFANIE EX'S FAIRLY WELL.  NOTED HAMS CRAMPING WITH BRIDGES.  PT IS VERY TIGHT IN HIS HIPS, LE.  HE WAS CHALLENGED AND FATIGUED QUICKLY WITH TODAY'S THER EX AND BALANCING ACTIVITIES.  PT FELT LOOSER BUT SORE AFTER TODAY'S VISIT.    Plan:  OP PT Plan  Treatment/Interventions: Manual therapy, Neuromuscular re-education, Self care/ home management, Therapeutic activities, Therapeutic exercises  PT Plan: Skilled PT (ASSESS TODAY'S VISIT AND PROGRESS HEP AS ABLE)  PT Frequency: 2 times per week  Duration: 8 weeks  Onset Date: 01/01/25  Certification Period Start Date: 05/15/25  Certification Period End Date: 08/12/25  Number of Treatments Authorized: 2/?  Rehab Potential: Fair  Plan of Care Agreement: Patient    Goals:  Active       PT Problem       PT Goal 1       Start:  05/15/25    Expected End:  08/12/25       STG  1) Patient will improve ABC score by 7% in order to perform functional activities at home and in the community in 4 weeks.  2) Patient will improve 5 x sit to stand to 15 seconds to demonstrate improved LE strength and ease of transfers in 4 weeks.   3) Patient will improve RIGHT knee ROM to -10 to 105 deg's for ease of transfers and to reduce pain during ADL's and for reduced fall risk in 4 weeks.    4) Patient will be independent with HEP to allow for continued improvement in daily tasks at home and in the  "community in 3weeks.   LTG  1) Patient will improve OTW standing to 3\" indicating improved hip and knee ROM and improved posture towards managing back pain in 8 weeks.  2) Patient will be able to perform step up exercises at 4-6 inches for ease of stair climbing and curb negotiation for ease of community access in 8 weeks.   3) Patient will improve LEFS  >/= 45/80 to indicate improved LE function and ability to demonstrate reduced fall risk within the community in 8 weeks.   4) Patient will improve mCTSIB to > 60/120 indicating improved balance of various surfaces and reduced fall risk in 8 weeks.    5) Patient will improve ABC score by 12% in order to perform functional activities at home and in the community in 8 weeks.               Time Calculation  Start Time: 1355  Stop Time: 1442  Time Calculation (min): 47 min  PT Therapeutic Procedures Time Entry  Manual Therapy Time Entry: 15  Neuromuscular Re-Education Time Entry: 5  Therapeutic Exercise Time Entry: 27,    "

## 2025-05-22 ENCOUNTER — APPOINTMENT (OUTPATIENT)
Dept: PHYSICAL THERAPY | Facility: CLINIC | Age: 75
End: 2025-05-22
Payer: MEDICARE

## 2025-05-22 DIAGNOSIS — R26.2 DIFFICULTY WALKING: Primary | ICD-10-CM

## 2025-05-22 DIAGNOSIS — R53.81 PHYSICAL DECONDITIONING: ICD-10-CM

## 2025-06-03 ENCOUNTER — TREATMENT (OUTPATIENT)
Dept: PHYSICAL THERAPY | Facility: CLINIC | Age: 75
End: 2025-06-03
Payer: MEDICARE

## 2025-06-03 DIAGNOSIS — R26.2 DIFFICULTY WALKING: Primary | ICD-10-CM

## 2025-06-03 DIAGNOSIS — R53.81 PHYSICAL DECONDITIONING: ICD-10-CM

## 2025-06-03 PROCEDURE — 97140 MANUAL THERAPY 1/> REGIONS: CPT | Mod: GP,CQ

## 2025-06-03 PROCEDURE — 97110 THERAPEUTIC EXERCISES: CPT | Mod: GP,CQ

## 2025-06-03 PROCEDURE — 97112 NEUROMUSCULAR REEDUCATION: CPT | Mod: GP,CQ

## 2025-06-03 ASSESSMENT — PAIN - FUNCTIONAL ASSESSMENT: PAIN_FUNCTIONAL_ASSESSMENT: 0-10

## 2025-06-03 ASSESSMENT — PAIN SCALES - GENERAL: PAINLEVEL_OUTOF10: 5 - MODERATE PAIN

## 2025-06-03 NOTE — PROGRESS NOTES
"Physical Therapy Treatment    Patient Name: Silvino Jacobo  MRN: [id number]  Today's Date: 6/3/2025    Current Problem  Problem List Items Addressed This Visit           ICD-10-CM    Physical deconditioning R53.81    Difficulty walking - Primary R26.2     Other Visit Diagnoses         Codes      Body mass index (BMI) 40.0-44.9, adult (Multi)     Z68.41            Insurance:  Payor: MEDICARE / Plan: MEDICARE PART A AND B / Product Type: *No Product type* /   Number of Treatments Authorized: 3/?  Certification Period Start Date: 05/15/25  Certification Period End Date: 08/12/25    Subjective   General  Reason for Referral: Deconditioned, Knee OA, Balance  Referred By: Marie Malave, APRN-CNP  Past Medical History Relevant to Rehab: HTN, OA,Chronic Lymphocytic leukemia, vascular disease,Abdominal aortic aneurysm w/o rupture.  General Comment: PT STATES HIS BACK FEELS TIGHT AND HIS HIPS ARE SORE.  HIS KNEES ALWAYS HURT WHEN HE WALKS.    Performing HEP?: Yes    Precautions  Precautions  STEADI Fall Risk Score (The score of 4 or more indicates an increased risk of falling): 5  Precautions Comment: HTN, OA,Chronic Lymphocytic leukemia, vascular disease,Abdominal aortic aneurysm w/o rupture.  Pain  Pain Assessment: 0-10  0-10 (Numeric) Pain Score: 5 - Moderate pain  Pain Location: Back (HIPS, KNEES)    Objective   General Observation  General Observation: FWD POSTURE USING CANE    Treatments:    Therapeutic Exercise  Therapeutic Exercise Activity 1: SUPINE SBALL DKTC X 2 MIN  Therapeutic Exercise Activity 2: SUPINE SBALL LB ROT X 2 MIN  Therapeutic Exercise Activity 3: SLR R/L X 1 MIN EACH  Therapeutic Exercise Activity 4: HL HIP ADD HOLD 10\" X 2 MIN  Therapeutic Exercise Activity 5: HL HIP ABD R/L ALT GREEN TBAND X 2 MIN  Therapeutic Exercise Activity 6: BRIDGE HOLD 5\" X 2 MIN  Therapeutic Exercise Activity 7: GASTROC STRETCH X 1 MIN  Therapeutic Exercise Activity 8: HEEL RAISES X 10  Therapeutic Exercise Activity 9: SIT " TO STAND 2 BLUE PADS MINIMAL HAND PUSH OFF X 10    Balance/Neuromuscular Re-Education  Balance/Neuromuscular Re-Education Activity 1: AIRE[employer] BEAM BALANCING // BAR X 2 MIN  Balance/Neuromuscular Re-Education Activity 2: AIRE[employer] BEAM SIDE STEP // BAR X 2 MIN    Manual Therapy  Manual Therapy Activity 1: MFR R/L LEG PULL  Manual Therapy Activity 2: STRETCH HAM, GLUT  Manual Therapy Activity 3: PROM BOTH KNEE FLEX, EXT; HIP FLEX, ER, IR                        OP EDUCATION:  Outpatient Education  Education Comment: Access Code: [email or web address]  URL: [email or web address]  Date: 06/03/2025  Prepared by: Joselito Chand    Exercises  - Supine Lower Trunk Rotation  - 2 x daily - 7 x weekly - 1 sets - 20 reps - 10 sec hold  - Supine Active Straight Leg Raise  - 1-2 x daily - 7 x weekly - 1 sets - 20 reps  - Supine Bridge  - 1-2 x daily - 7 x weekly - 1 sets - 20 reps - 10 sec hold  - Supine Hip Adduction Isometric with Ball  - 1-2 x daily - 7 x weekly - 1 sets - 20 reps - 10 sec hold  - Hooklying Isometric Clamshell  - 1-2 x daily - 7 x weekly - 1 sets - 20 reps  - Standing Heel Raise with Support  - 1-2 x daily - 7 x weekly - 1 sets - 20 reps    Assessment:  PT Assessment  Assessment Comment: PT STEFFANIE EX'S WELL.  HE IS CHALLENGED AND FATIGUES WITH TODAY'S THER EX AND BALANCE ACTIVITIES.  PT FELT SORE IN HIS HIPS AFTER TODAY'S SESSION.    Plan:  OP PT Plan  Treatment/Interventions: Manual therapy, Neuromuscular re-education, Self care/ home management, Therapeutic activities, Therapeutic exercises  PT Plan: Skilled PT (ASSESS TODAY'S VISIT AND PROGRESS HEP AS ABLE)  PT Frequency: 2 times per week  Duration: 8 weeks  Onset Date: 01/01/25  Certification Period Start Date: 05/15/25  Certification Period End Date: 08/12/25  Number of Treatments Authorized: 3/?  Rehab Potential: Fair  Plan of Care Agreement: Patient    Goals:  Active       PT Problem       PT Goal 1       Start:  05/15/25    Expected End:  08/12/25        "STG  1) Patient will improve ABC score by 7% in order to perform functional activities at home and in the community in 4 weeks.  2) Patient will improve 5 x sit to stand to 15 seconds to demonstrate improved LE strength and ease of transfers in 4 weeks.   3) Patient will improve RIGHT knee ROM to -10 to 105 deg's for ease of transfers and to reduce pain during ADL's and for reduced fall risk in 4 weeks.    4) Patient will be independent with HEP to allow for continued improvement in daily tasks at home and in the community in 3weeks.   LTG  1) Patient will improve OTW standing to 3\" indicating improved hip and knee ROM and improved posture towards managing back pain in 8 weeks.  2) Patient will be able to perform step up exercises at 4-6 inches for ease of stair climbing and curb negotiation for ease of community access in 8 weeks.   3) Patient will improve LEFS  >/= 45/80 to indicate improved LE function and ability to demonstrate reduced fall risk within the community in 8 weeks.   4) Patient will improve mCTSIB to > 60/120 indicating improved balance of various surfaces and reduced fall risk in 8 weeks.    5) Patient will improve ABC score by 12% in order to perform functional activities at home and in the community in 8 weeks.               Time Calculation  Start Time: 1116  Stop Time: 1158  Time Calculation (min): 42 min  PT Therapeutic Procedures Time Entry  Manual Therapy Time Entry: 18  Neuromuscular Re-Education Time Entry: 6  Therapeutic Exercise Time Entry: 18,    "

## 2025-06-05 ENCOUNTER — TREATMENT (OUTPATIENT)
Dept: PHYSICAL THERAPY | Facility: CLINIC | Age: 75
End: 2025-06-05
Payer: MEDICARE

## 2025-06-05 DIAGNOSIS — R26.2 DIFFICULTY WALKING: Primary | ICD-10-CM

## 2025-06-05 DIAGNOSIS — R53.81 PHYSICAL DECONDITIONING: ICD-10-CM

## 2025-06-05 PROCEDURE — 97112 NEUROMUSCULAR REEDUCATION: CPT | Mod: GP,CQ

## 2025-06-05 PROCEDURE — 97110 THERAPEUTIC EXERCISES: CPT | Mod: GP,CQ

## 2025-06-05 PROCEDURE — 97140 MANUAL THERAPY 1/> REGIONS: CPT | Mod: GP,CQ

## 2025-06-05 ASSESSMENT — PAIN SCALES - GENERAL: PAINLEVEL_OUTOF10: 5 - MODERATE PAIN

## 2025-06-05 ASSESSMENT — PAIN - FUNCTIONAL ASSESSMENT: PAIN_FUNCTIONAL_ASSESSMENT: 0-10

## 2025-06-05 NOTE — PROGRESS NOTES
"Physical Therapy Treatment    Patient Name: Silvino Jacobo  MRN: [id number]  Today's Date: 6/5/2025    Current Problem  Problem List Items Addressed This Visit           ICD-10-CM    Physical deconditioning R53.81    Difficulty walking - Primary R26.2     Other Visit Diagnoses         Codes      Body mass index (BMI) 40.0-44.9, adult (Multi)     Z68.41            Insurance:  Payor: MEDICARE / Plan: MEDICARE PART A AND B / Product Type: *No Product type* /   Number of Treatments Authorized: 4/?  Certification Period Start Date: 05/15/25  Certification Period End Date: 08/12/25    Subjective   General  Reason for Referral: Deconditioned, Knee OA, Balance  Referred By: Marie Malave, APRN-CNP  Past Medical History Relevant to Rehab: HTN, OA,Chronic Lymphocytic leukemia, vascular disease,Abdominal aortic aneurysm w/o rupture.  General Comment: PT STATES HE WAS VERY SORE IN HIS LB AND HIPS YESTERDAY.    Performing HEP?: Yes    Precautions  Precautions  Precautions Comment: HTN, OA,Chronic Lymphocytic leukemia, vascular disease,Abdominal aortic aneurysm w/o rupture.  Pain  Pain Assessment: 0-10  0-10 (Numeric) Pain Score: 5 - Moderate pain  Pain Location: Back (HIPS AND KNEES)    Objective   General Observation  General Observation: FWD POSTURE USING CANE    Treatments:    Therapeutic Exercise  Therapeutic Exercise Activity 1: SUPINE SBALL DKTC X 2 MIN  Therapeutic Exercise Activity 2: SUPINE SBALL LB ROT X 2 MIN  Therapeutic Exercise Activity 3: SLR R/L X 1 MIN EACH  Therapeutic Exercise Activity 4: HL HIP ADD HOLD 10\" X 2 MIN  Therapeutic Exercise Activity 5: HL HIP ABD R/L ALT GREEN TBAND X 2 MIN  Therapeutic Exercise Activity 6: BRIDGE HOLD 5\" X 2 MIN  Therapeutic Exercise Activity 7: GASTROC STRETCH X 1 MIN  Therapeutic Exercise Activity 8: HEEL RAISES X 1 MIN  Therapeutic Exercise Activity 9: SIT TO STAND 2 BLUE PADS MINIMAL HAND PUSH OFF 2 X 10    Balance/Neuromuscular Re-Education  Balance/Neuromuscular " "Re-Education Activity 1: [employer]RE[employer] BEAM BALANCING // BAR X 2 MIN  Balance/Neuromuscular Re-Education Activity 2: AIRE[employer] BEAM SIDE STEP // BAR X 2 MIN    Manual Therapy  Manual Therapy Activity 1: MFR R/L LEG PULL  Manual Therapy Activity 2: STRETCH HAM, GLUT  Manual Therapy Activity 3: PROM BOTH KNEE FLEX, EXT; HIP FLEX, ER, IR                        OP EDUCATION:  Outpatient Education  Education Comment: CONTINUE WITH CURRENT HEP    Assessment:  PT Assessment  Assessment Comment: PT STEFFANIE EX'S WELL.  PT WILL GET L HAM CRAMPING WHEN PERFORMING BRIDGES AND HIP STRENGHTNENING EX'S.  PT IS SLOWLY PROGRESSING WITH HIS STRENGTH AND BALANCE.  PT WAS STILL SORE IN HIS HIPS AND KNEES AFTER TODAY'S VISIT.    Plan:  OP PT Plan  Treatment/Interventions: Manual therapy, Neuromuscular re-education, Self care/ home management, Therapeutic activities, Therapeutic exercises  PT Plan: Skilled PT  PT Frequency: 2 times per week  Duration: 8 weeks  Onset Date: 01/01/25  Certification Period Start Date: 05/15/25  Certification Period End Date: 08/12/25  Number of Treatments Authorized: 4/?  Rehab Potential: Fair  Plan of Care Agreement: Patient    Goals:  Active       PT Problem       PT Goal 1       Start:  05/15/25    Expected End:  08/12/25       STG  1) Patient will improve ABC score by 7% in order to perform functional activities at home and in the community in 4 weeks.  2) Patient will improve 5 x sit to stand to 15 seconds to demonstrate improved LE strength and ease of transfers in 4 weeks.   3) Patient will improve RIGHT knee ROM to -10 to 105 deg's for ease of transfers and to reduce pain during ADL's and for reduced fall risk in 4 weeks.    4) Patient will be independent with HEP to allow for continued improvement in daily tasks at home and in the community in 3weeks.   LTG  1) Patient will improve OTW standing to 3\" indicating improved hip and knee ROM and improved posture towards managing back pain in 8 weeks.  2) Patient will be " able to perform step up exercises at 4-6 inches for ease of stair climbing and curb negotiation for ease of community access in 8 weeks.   3) Patient will improve LEFS  >/= 45/80 to indicate improved LE function and ability to demonstrate reduced fall risk within the community in 8 weeks.   4) Patient will improve mCTSIB to > 60/120 indicating improved balance of various surfaces and reduced fall risk in 8 weeks.    5) Patient will improve ABC score by 12% in order to perform functional activities at home and in the community in 8 weeks.               Time Calculation  Start Time: 1334  Stop Time: 1415  Time Calculation (min): 41 min  PT Therapeutic Procedures Time Entry  Manual Therapy Time Entry: 19  Neuromuscular Re-Education Time Entry: 6  Therapeutic Exercise Time Entry: 16,

## 2025-06-10 ENCOUNTER — TREATMENT (OUTPATIENT)
Dept: PHYSICAL THERAPY | Facility: CLINIC | Age: 75
End: 2025-06-10
Payer: MEDICARE

## 2025-06-10 DIAGNOSIS — R53.81 PHYSICAL DECONDITIONING: ICD-10-CM

## 2025-06-10 DIAGNOSIS — R26.2 DIFFICULTY WALKING: Primary | ICD-10-CM

## 2025-06-10 PROCEDURE — 97110 THERAPEUTIC EXERCISES: CPT | Mod: GP | Performed by: PHYSICAL THERAPIST

## 2025-06-10 PROCEDURE — 97140 MANUAL THERAPY 1/> REGIONS: CPT | Mod: GP | Performed by: PHYSICAL THERAPIST

## 2025-06-10 ASSESSMENT — PAIN - FUNCTIONAL ASSESSMENT: PAIN_FUNCTIONAL_ASSESSMENT: 0-10

## 2025-06-10 ASSESSMENT — PAIN SCALES - GENERAL: PAINLEVEL_OUTOF10: 5 - MODERATE PAIN

## 2025-06-10 NOTE — PROGRESS NOTES
"Physical Therapy Treatment    Patient Name: Silvino Jacobo  MRN: [id number]  Today's Date: 6/10/2025    Current Problem  Problem List Items Addressed This Visit           ICD-10-CM    Physical deconditioning R53.81    Difficulty walking - Primary R26.2     Other Visit Diagnoses         Codes      Body mass index (BMI) 40.0-44.9, adult (Multi)     Z68.41            Insurance:  Payor: MEDICARE / Plan: MEDICARE PART A AND B / Product Type: *No Product type* /   Number of Treatments Authorized: 4/MN  Certification Period Start Date: 05/15/25  Certification Period End Date: 08/12/25    Subjective   General  Reason for Referral: Deconditioned, Knee OA, Balance  Referred By: Marie Malave, APRN-CNP  Past Medical History Relevant to Rehab: HTN, OA,Chronic Lymphocytic leukemia, vascular disease,Abdominal aortic aneurysm w/o rupture.  General Comment: States L hip and knee have been worse during the last few days. Finding HEP difficult to do - hard to complete.    Performing HEP?: Yes    Precautions  Precautions  Precautions Comment: HTN, OA,Chronic Lymphocytic leukemia, vascular disease,Abdominal aortic aneurysm w/o rupture.  Pain  Pain Assessment: 0-10  0-10 (Numeric) Pain Score: 5 - Moderate pain  Pain Location: Back (hips and knees)    Objective     General Observation  General Observation: FWD POSTURE USING CANE    Treatments:    Therapeutic Exercise  Therapeutic Exercise Activity 1: SciFit: seat 13 w/strap pedals x 6 min  Therapeutic Exercise Activity 2: Heel raises: x 10  Therapeutic Exercise Activity 3: SLR: R/L 2 x 10  Therapeutic Exercise Activity 4: QS: 5 hold x 10  Therapeutic Exercise Activity 5: HL HIP ABD R/L ALT GREEN TBAND X 2 MIN  Therapeutic Exercise Activity 6: BRIDGE HOLD 5\" - 2 x 10  Therapeutic Exercise Activity 7: Mana: leg press 5 hold x 10 - B     Manual Therapy  Manual Therapy Activity 1: Supine knee rotation/AP/PA/sheer mobs - B  Manual Therapy Activity 2: PF mobs - B    OP EDUCATION:  Outpatient " "Education  Education Comment: CONTINUE WITH CURRENT HEP    Assessment:  PT Assessment  Assessment Comment: Patient able to perform bike today with strap pedals.  Improved quality of cycling with increasing duration.  Less knee pain with increasing duration. Continiues to be limited by significant degenerative changes at hips and knees.  Unable to have surgery due to leukemia. Will benefit from continued strengthening and ROM towards mobility goals.  Tolerated session well.  Less discomfort following manual.   Plan:  OP PT Plan  Treatment/Interventions: Manual therapy, Neuromuscular re-education, Self care/ home management, Therapeutic activities, Therapeutic exercises  PT Plan: Skilled PT  PT Frequency: 2 times per week  Duration: 8 weeks  Onset Date: 01/01/25  Certification Period Start Date: 05/15/25  Certification Period End Date: 08/12/25  Number of Treatments Authorized: 4/MN  Rehab Potential: Fair  Plan of Care Agreement: Patient  Progress as tolerated. Monitor response to session.   Goals:  Active       PT Problem       PT Goal 1       Start:  05/15/25    Expected End:  08/12/25       STG  1) Patient will improve ABC score by 7% in order to perform functional activities at home and in the community in 4 weeks.  2) Patient will improve 5 x sit to stand to 15 seconds to demonstrate improved LE strength and ease of transfers in 4 weeks.   3) Patient will improve RIGHT knee ROM to -10 to 105 deg's for ease of transfers and to reduce pain during ADL's and for reduced fall risk in 4 weeks.    4) Patient will be independent with HEP to allow for continued improvement in daily tasks at home and in the community in 3weeks.   LTG  1) Patient will improve OTW standing to 3\" indicating improved hip and knee ROM and improved posture towards managing back pain in 8 weeks.  2) Patient will be able to perform step up exercises at 4-6 inches for ease of stair climbing and curb negotiation for ease of community access in 8 " weeks.   3) Patient will improve LEFS  >/= 45/80 to indicate improved LE function and ability to demonstrate reduced fall risk within the community in 8 weeks.   4) Patient will improve mCTSIB to > 60/120 indicating improved balance of various surfaces and reduced fall risk in 8 weeks.    5) Patient will improve ABC score by 12% in order to perform functional activities at home and in the community in 8 weeks.               Time Calculation  Start Time: 0915  Stop Time: 1000  Time Calculation (min): 45 min  PT Therapeutic Procedures Time Entry  Manual Therapy Time Entry: 13  Therapeutic Exercise Time Entry: 27,

## 2025-06-12 ENCOUNTER — TREATMENT (OUTPATIENT)
Dept: PHYSICAL THERAPY | Facility: CLINIC | Age: 75
End: 2025-06-12
Payer: MEDICARE

## 2025-06-12 DIAGNOSIS — R53.81 PHYSICAL DECONDITIONING: ICD-10-CM

## 2025-06-12 DIAGNOSIS — R26.2 DIFFICULTY WALKING: Primary | ICD-10-CM

## 2025-06-12 PROCEDURE — 97140 MANUAL THERAPY 1/> REGIONS: CPT | Mod: GP | Performed by: PHYSICAL THERAPIST

## 2025-06-12 PROCEDURE — 97110 THERAPEUTIC EXERCISES: CPT | Mod: GP | Performed by: PHYSICAL THERAPIST

## 2025-06-12 ASSESSMENT — PAIN SCALES - GENERAL: PAINLEVEL_OUTOF10: 6

## 2025-06-12 ASSESSMENT — PAIN - FUNCTIONAL ASSESSMENT: PAIN_FUNCTIONAL_ASSESSMENT: 0-10

## 2025-06-12 NOTE — PROGRESS NOTES
"Physical Therapy Treatment    Patient Name: Silvino Jacobo  MRN: [id number]  Today's Date: 6/12/2025    Current Problem  Problem List Items Addressed This Visit           ICD-10-CM    Physical deconditioning R53.81    Difficulty walking - Primary R26.2     Other Visit Diagnoses         Codes      Body mass index (BMI) 40.0-44.9, adult (Multi)     Z68.41            Insurance:  Payor: MEDICARE / Plan: MEDICARE PART A AND B / Product Type: *No Product type* /   Number of Treatments Authorized: 5/MN  Certification Period Start Date: 05/15/25  Certification Period End Date: 08/12/25    Subjective   General  Reason for Referral: Deconditioned, Knee OA, Balance  Referred By: Marie Malave, APRN-CNP  Past Medical History Relevant to Rehab: HTN, OA,Chronic Lymphocytic leukemia, vascular disease,Abdominal aortic aneurysm w/o rupture.  General Comment: No complaints re: last session. R knee feeling tight today.    Performing HEP?: Yes    Precautions  Precautions  Precautions Comment: HTN, OA,Chronic Lymphocytic leukemia, vascular disease,Abdominal aortic aneurysm w/o rupture.  Pain  Pain Assessment: 0-10  0-10 (Numeric) Pain Score: 6  Pain Location: Knee (Back is \"OK\" today)    Objective     General Observation  General Observation: FWD flexed posture during gait with heavy reliance on SPC  R knee flexion: 100 deg   L knee flexion:  108 deg    Treatments:    Therapeutic Exercise  Therapeutic Exercise Activity 1: SciFit: seat 13 w/strap pedals x 6 min  Therapeutic Exercise Activity 2: Heel/toe raises: x 10  Therapeutic Exercise Activity 3: Slant board calf stretch: 1 min  Therapeutic Exercise Activity 4: Standing hip abd: ALT x 5  Therapeutic Exercise Activity 5: Standing hip ext: ALT x 5  Therapeutic Exercise Activity 6: Sit to stand: 2 pads on chair - 2 x 5  Therapeutic Exercise Activity 7: SLR: R/L  2 x 10  Therapeutic Exercise Activity 8: Bridge: 5 hold 2 x 10  Therapeutic Exercise Activity 9: Mana: leg press: 5 hold x 10 " "- B     Manual Therapy  Manual Therapy Activity 1: Supine knee mobs: A/P, P/A,sheer, rotation - B  Manual Therapy Activity 2: PF mobs - B    OP EDUCATION:  Outpatient Education  Education Comment: CONTINUE WITH CURRENT HEP    Assessment:  PT Assessment  Assessment Comment: Patient a little light headed at end of session. Resolved with rest.  No pain complaints during session. Reporting ease of movement and knees feeling better following manual . Hips remain tight and painful. May benefit from leg pull for inferior capsule stretch of hips.    Plan:  OP PT Plan  Treatment/Interventions: Manual therapy, Neuromuscular re-education, Self care/ home management, Therapeutic activities, Therapeutic exercises  PT Plan: Skilled PT  PT Frequency: 2 times per week  Duration: 8 weeks  Onset Date: 01/01/25  Certification Period Start Date: 05/15/25  Certification Period End Date: 08/12/25  Number of Treatments Authorized: 5/MN  Rehab Potential: Fair  Plan of Care Agreement: Patient  Continue to monitor response to session.   Consider adding leg pull for inferior hip capsule distraction.   Check occiput to wall distance (OTW)  Goals:  Active       PT Problem       PT Goal 1       Start:  05/15/25    Expected End:  08/12/25       STG  1) Patient will improve ABC score by 7% in order to perform functional activities at home and in the community in 4 weeks.  2) Patient will improve 5 x sit to stand to 15 seconds to demonstrate improved LE strength and ease of transfers in 4 weeks.   3) Patient will improve RIGHT knee ROM to -10 to 105 deg's for ease of transfers and to reduce pain during ADL's and for reduced fall risk in 4 weeks.    4) Patient will be independent with HEP to allow for continued improvement in daily tasks at home and in the community in 3weeks.   LTG  1) Patient will improve OTW standing to 3\" indicating improved hip and knee ROM and improved posture towards managing back pain in 8 weeks.  2) Patient will be able to " perform step up exercises at 4-6 inches for ease of stair climbing and curb negotiation for ease of community access in 8 weeks.   3) Patient will improve LEFS  >/= 45/80 to indicate improved LE function and ability to demonstrate reduced fall risk within the community in 8 weeks.   4) Patient will improve mCTSIB to > 60/120 indicating improved balance of various surfaces and reduced fall risk in 8 weeks.    5) Patient will improve ABC score by 12% in order to perform functional activities at home and in the community in 8 weeks.               Time Calculation  Start Time: 0915  Stop Time: 1000  Time Calculation (min): 45 min  PT Therapeutic Procedures Time Entry  Therapeutic Exercise Time Entry: 26  Manual Therapy Time Entry: 15,

## 2025-06-16 ENCOUNTER — TREATMENT (OUTPATIENT)
Dept: PHYSICAL THERAPY | Facility: CLINIC | Age: 75
End: 2025-06-16
Payer: MEDICARE

## 2025-06-16 ENCOUNTER — APPOINTMENT (OUTPATIENT)
Dept: SLEEP MEDICINE | Facility: CLINIC | Age: 75
End: 2025-06-16
Payer: MEDICARE

## 2025-06-16 VITALS
OXYGEN SATURATION: 92 % | WEIGHT: 290 LBS | HEART RATE: 59 BPM | BODY MASS INDEX: 40.6 KG/M2 | DIASTOLIC BLOOD PRESSURE: 82 MMHG | HEIGHT: 71 IN | SYSTOLIC BLOOD PRESSURE: 136 MMHG

## 2025-06-16 DIAGNOSIS — R26.2 DIFFICULTY WALKING: Primary | ICD-10-CM

## 2025-06-16 DIAGNOSIS — R53.81 PHYSICAL DECONDITIONING: ICD-10-CM

## 2025-06-16 DIAGNOSIS — G47.33 OBSTRUCTIVE SLEEP APNEA (ADULT) (PEDIATRIC): ICD-10-CM

## 2025-06-16 PROCEDURE — 97140 MANUAL THERAPY 1/> REGIONS: CPT | Mod: GP,CQ

## 2025-06-16 PROCEDURE — 99214 OFFICE O/P EST MOD 30 MIN: CPT | Performed by: INTERNAL MEDICINE

## 2025-06-16 PROCEDURE — 1036F TOBACCO NON-USER: CPT | Performed by: INTERNAL MEDICINE

## 2025-06-16 PROCEDURE — 1159F MED LIST DOCD IN RCRD: CPT | Performed by: INTERNAL MEDICINE

## 2025-06-16 PROCEDURE — 3079F DIAST BP 80-89 MM HG: CPT | Performed by: INTERNAL MEDICINE

## 2025-06-16 PROCEDURE — 97110 THERAPEUTIC EXERCISES: CPT | Mod: GP,CQ

## 2025-06-16 PROCEDURE — 3075F SYST BP GE 130 - 139MM HG: CPT | Performed by: INTERNAL MEDICINE

## 2025-06-16 PROCEDURE — 1160F RVW MEDS BY RX/DR IN RCRD: CPT | Performed by: INTERNAL MEDICINE

## 2025-06-16 PROCEDURE — 1126F AMNT PAIN NOTED NONE PRSNT: CPT | Performed by: INTERNAL MEDICINE

## 2025-06-16 PROCEDURE — G2211 COMPLEX E/M VISIT ADD ON: HCPCS | Performed by: INTERNAL MEDICINE

## 2025-06-16 ASSESSMENT — SLEEP AND FATIGUE QUESTIONNAIRES
SATISFACTION_WITH_CURRENT_SLEEP_PATTERN: SATISFIED
HOW LIKELY ARE YOU TO NOD OFF OR FALL ASLEEP WHILE SITTING QUIETLY AFTER LUNCH WITHOUT ALCOHOL: WOULD NEVER DOZE
HOW LIKELY ARE YOU TO NOD OFF OR FALL ASLEEP WHEN YOU ARE A PASSENGER IN A CAR FOR AN HOUR WITHOUT A BREAK: WOULD NEVER DOZE
SLEEP_PROBLEM_NOTICEABLE_TO_OTHERS: A LITTLE
WAKING_TOO_EARLY: MODERATE
HOW LIKELY ARE YOU TO NOD OFF OR FALL ASLEEP WHILE SITTING AND TALKING TO SOMEONE: WOULD NEVER DOZE
DIFFICULTY_FALLING_ASLEEP: MODERATE
SLEEP_PROBLEM_INTERFERES_DAILY_ACTIVITIES: A LITTLE
SITING INACTIVE IN A PUBLIC PLACE LIKE A CLASS ROOM OR A MOVIE THEATER: WOULD NEVER DOZE
HOW LIKELY ARE YOU TO NOD OFF OR FALL ASLEEP WHILE LYING DOWN TO REST IN THE AFTERNOON WHEN CIRCUMSTANCES PERMIT: MODERATE CHANCE OF DOZING
HOW LIKELY ARE YOU TO NOD OFF OR FALL ASLEEP IN A CAR, WHILE STOPPED FOR A FEW MINUTES IN TRAFFIC: WOULD NEVER DOZE
HOW LIKELY ARE YOU TO NOD OFF OR FALL ASLEEP WHILE WATCHING TV: SLIGHT CHANCE OF DOZING
ESS-CHAD TOTAL SCORE: 4
WORRIED_DISTRESSED_DUE_TO_SLEEP: A LITTLE
HOW LIKELY ARE YOU TO NOD OFF OR FALL ASLEEP WHILE SITTING AND READING: SLIGHT CHANCE OF DOZING
DIFFICULTY_STAYING_ASLEEP: MODERATE

## 2025-06-16 ASSESSMENT — PAIN SCALES - GENERAL
PAINLEVEL_OUTOF10: 0-NO PAIN
PAINLEVEL_OUTOF10: 5 - MODERATE PAIN

## 2025-06-16 ASSESSMENT — PAIN - FUNCTIONAL ASSESSMENT: PAIN_FUNCTIONAL_ASSESSMENT: 0-10

## 2025-06-16 NOTE — PROGRESS NOTES
"Subjective   Patient ID: Silvino Jacobo is a 74 y.o. male who presents for Sleep Apnea and Pap Adherence Followup.    Prior sleep history:  PSG 6/16/2017: AHI 81     HPI  History of Present Illness  The patient follows up on obstructive sleep apnea managed with CPAP at 12 cm H2O. He received a new CPAP machine due to the previous one being irreparable.    Compliance report from 05/17/2025 to 06/16/2025 shows 100% usage over 4 hours, average usage of 6.5 hours, CPAP of 12 cm H2O, residual AHI of 6.1, and mask leak of 74 L/min.    The patient reports satisfactory use of the new CPAP equipment, with no significant issues. He appreciates the mask leak monitoring feature. The Velcro strip on the mask occasionally loosens but does not significantly impact his experience. No other concerns or questions. Supply company: [employer].       ESS: 4   Insomnia Severity Index  1. Difficulty falling asleep: Moderate  2. Difficulty staying asleep: Moderate  3. Problems waking up too early: Moderate  4. How SATISFIED/DISSATISFIED are you with your CURRENT sleep pattern?: Satisfied  5. How NOTICEABLE to others do you think your sleep problem is in terms of impairing the quality of your life?: A Little  6. How WORRIED/DISTRESSED are you about your current sleep problem?: A Little  7. To what extent do you consider your sleep problem to INTERFERE with your daily functioning (e.g. daytime fatigue, mood, ability to function at work/daily chores, concentration, memory, mood, etc.) CURRENTLY?: A Little  DOTTIE TS: 0-7 = No clinically significant insomnia 8-14 = Subthreshold insomnia 15-21 = Clinical insomnia (moderate severity) 22-28 = Clinical insomnia (severe): 11   FOSQ: 33    Review of Systems  Review of systems negative except as per HPI  Objective     /82   Pulse 59   Ht 1.803 m (5' 11\")   Wt 132 kg (290 lb)   SpO2 92%   BMI 40.45 kg/m²      Physical Exam       PHYSICAL EXAM: GENERAL: alert pleasant and cooperative no acute " distress  PSYCH EXAM: alert,oriented, in NAD with a full range of affect, normal behavior and no psychotic features     Results  CPAP compliance report  CPAP compliance report shows 100% usage over 4 hours, average usage of 6.5 hours, CPAP of 12 cm H2O, residual AHI of 6.1, and mask leak of 74 L/min.       Assessment/Plan     Assessment & Plan  Obstructive sleep apnea  Condition well-managed with CPAP at 12 cm H2O, average usage of 6.5 hours, residual AHI of 6.1. Annual prescription will be ordered. Follow-up in 1 year unless needed earlier. Supplies will be renewed throughout the year.  Follow-up: Follow up in 1 year.     Diagnoses and all orders for this visit:  Obstructive sleep apnea (adult) (pediatric)  -     Follow Up In Adult Sleep Medicine  -     Positive Airway Pressure (PAP) Therapy  -     Follow Up In Adult Sleep Medicine; Future    There are no Patient Instructions on file for this visit.     This medical note was created with the assistance of artificial intelligence (AI) for documentation purposes. The content has been reviewed and confirmed by the healthcare provider for accuracy and completeness. Patient consented to the use of audio recording and use of AI during their visit.

## 2025-06-16 NOTE — PROGRESS NOTES
Physical Therapy Treatment    Patient Name: Silvino Jacobo  MRN: [id number]  Today's Date: 6/16/2025    Current Problem  Problem List Items Addressed This Visit           ICD-10-CM    Physical deconditioning R53.81    Difficulty walking - Primary R26.2     Other Visit Diagnoses         Codes      Body mass index (BMI) 40.0-44.9, adult (Multi)     Z68.41            Insurance:  Payor: MEDICARE / Plan: MEDICARE PART A AND B / Product Type: *No Product type* /   Number of Treatments Authorized: 6/MN  Certification Period Start Date: 05/15/25  Certification Period End Date: 08/12/25    Subjective   General  Reason for Referral: Deconditioned, Knee OA, Balance  Referred By: SHARON Casanova-CNP  Past Medical History Relevant to Rehab: HTN, OA,Chronic Lymphocytic leukemia, vascular disease,Abdominal aortic aneurysm w/o rupture.  General Comment: PT STATES HIS L HIP IS HURTING TODAY.    Performing HEP?: Yes    Precautions  Precautions  Precautions Comment: HTN, OA,Chronic Lymphocytic leukemia, vascular disease,Abdominal aortic aneurysm w/o rupture.  Pain  Pain Assessment: 0-10  0-10 (Numeric) Pain Score: 5 - Moderate pain  Pain Location: Back (HIP)  Pain Orientation: Left    Objective   General Observation  General Observation: FWD flexed posture during gait with heavy reliance on SPC    Treatments:    Therapeutic Exercise  Therapeutic Exercise Activity 1: SciFit: seat 13 w/strap pedals x 6 min  Therapeutic Exercise Activity 2: Heel/toe raises: x 1 MIN  Therapeutic Exercise Activity 3: Slant board calf stretch: 1 min  Therapeutic Exercise Activity 4: Standing hip abd: ALT x 1 MIN  Therapeutic Exercise Activity 5: Standing hip ext: ALT x 1 MIN  Therapeutic Exercise Activity 6: Sit to stand: 2 pads on chair - 2 x 10  Therapeutic Exercise Activity 7: SLR: R/L  x 10  Therapeutic Exercise Activity 8: Bridge: 5 hold X 1 MIN    Balance/Neuromuscular Re-Education  Balance/Neuromuscular Re-Education Activity Performed:  "No    Manual Therapy  Manual Therapy Activity 1: PROM BOTH KNEE FLEX, EXT; HIP FLEX, ER, IR  Manual Therapy Activity 2: STRETCH HAM, GLUT  Manual Therapy Activity 3: MFR R/L LEG PULL                        OP EDUCATION:  Outpatient Education  Education Comment: CONTINUE WITH CURRENT HEP    Assessment:  PT Assessment  Assessment Comment: PT STEFFANIE EX'S WELL.  HE IS SLOWLY PROGRESSING WITH HIS STRENGTH AND ENDURANCE.  PT CONTINUES TO BE TIGHT IN HIS HIPS AND LE.  PT CONTINUES TO GET L HAM CRAMPING WITH BRIDGES.    Plan:  OP PT Plan  Treatment/Interventions: Manual therapy, Neuromuscular re-education, Self care/ home management, Therapeutic activities, Therapeutic exercises  PT Plan: Skilled PT  PT Frequency: 2 times per week  Duration: 8 weeks  Onset Date: 01/01/25  Certification Period Start Date: 05/15/25  Certification Period End Date: 08/12/25  Number of Treatments Authorized: 6/MN  Rehab Potential: Fair  Plan of Care Agreement: Patient    Goals:  Active       PT Problem       PT Goal 1       Start:  05/15/25    Expected End:  08/12/25       STG  1) Patient will improve ABC score by 7% in order to perform functional activities at home and in the community in 4 weeks.  2) Patient will improve 5 x sit to stand to 15 seconds to demonstrate improved LE strength and ease of transfers in 4 weeks.   3) Patient will improve RIGHT knee ROM to -10 to 105 deg's for ease of transfers and to reduce pain during ADL's and for reduced fall risk in 4 weeks.    4) Patient will be independent with HEP to allow for continued improvement in daily tasks at home and in the community in 3weeks.   LTG  1) Patient will improve OTW standing to 3\" indicating improved hip and knee ROM and improved posture towards managing back pain in 8 weeks.  2) Patient will be able to perform step up exercises at 4-6 inches for ease of stair climbing and curb negotiation for ease of community access in 8 weeks.   3) Patient will improve LEFS  >/= 45/80 to " indicate improved LE function and ability to demonstrate reduced fall risk within the community in 8 weeks.   4) Patient will improve mCTSIB to > 60/120 indicating improved balance of various surfaces and reduced fall risk in 8 weeks.    5) Patient will improve ABC score by 12% in order to perform functional activities at home and in the community in 8 weeks.               Time Calculation  Start Time: 1035  Stop Time: 1110  Time Calculation (min): 35 min  PT Therapeutic Procedures Time Entry  Therapeutic Exercise Time Entry: 23  Manual Therapy Time Entry: 12,

## 2025-06-18 ENCOUNTER — TREATMENT (OUTPATIENT)
Dept: PHYSICAL THERAPY | Facility: CLINIC | Age: 75
End: 2025-06-18
Payer: MEDICARE

## 2025-06-18 DIAGNOSIS — R26.2 DIFFICULTY WALKING: Primary | ICD-10-CM

## 2025-06-18 DIAGNOSIS — R53.81 PHYSICAL DECONDITIONING: ICD-10-CM

## 2025-06-18 PROCEDURE — 97110 THERAPEUTIC EXERCISES: CPT | Mod: GP | Performed by: PHYSICAL THERAPIST

## 2025-06-18 PROCEDURE — 97140 MANUAL THERAPY 1/> REGIONS: CPT | Mod: GP | Performed by: PHYSICAL THERAPIST

## 2025-06-18 ASSESSMENT — PAIN - FUNCTIONAL ASSESSMENT: PAIN_FUNCTIONAL_ASSESSMENT: 0-10

## 2025-06-18 ASSESSMENT — PAIN SCALES - GENERAL: PAINLEVEL_OUTOF10: 7

## 2025-06-18 NOTE — PROGRESS NOTES
Physical Therapy Treatment    Patient Name: Silvino Jacobo  MRN: [id number]  Today's Date: 6/18/2025    Current Problem  Problem List Items Addressed This Visit           ICD-10-CM    Physical deconditioning R53.81    Difficulty walking - Primary R26.2     Other Visit Diagnoses         Codes      Body mass index (BMI) 40.0-44.9, adult (Multi)     Z68.41            Insurance:  Payor: MEDICARE / Plan: MEDICARE PART A AND B / Product Type: *No Product type* /   Number of Treatments Authorized: 7/MN  Certification Period Start Date: 05/15/25  Certification Period End Date: 08/12/25    Subjective   General  Reason for Referral: Deconditioned, Knee OA, Balance  Referred By: SHARON Casanova-CNP  Past Medical History Relevant to Rehab: HTN, OA,Chronic Lymphocytic leukemia, vascular disease,Abdominal aortic aneurysm w/o rupture.  General Comment: Did not take Alieve today.    Performing HEP?: Yes    Precautions  Precautions  Precautions Comment: HTN, OA,Chronic Lymphocytic leukemia, vascular disease,Abdominal aortic aneurysm w/o rupture.  Pain  Pain Assessment: 0-10  0-10 (Numeric) Pain Score: 7  Pain Location: Knee  Pain Orientation: Right    Objective     General Observation  General Observation: FWD flexed posture during gait with heavy reliance on SPC    Treatments:    Therapeutic Exercise  Therapeutic Exercise Activity 1: SciFit: seat 13 w/strap pedals- level 2.5 x 8 min  Therapeutic Exercise Activity 2: Heel/toe raises: x 1 MIN  Therapeutic Exercise Activity 4: Standing hip abd: ALT x 1 MIN  Therapeutic Exercise Activity 5: Standing hip ext: ALT x 1 MIN  Therapeutic Exercise Activity 6: Sit to stand: 2 pads on chair - 2 x 10  Therapeutic Exercise Activity 7: SLR: R/L  x 1 min  Therapeutic Exercise Activity 8: Bridge: 5 hold X 1 MIN  Therapeutic Exercise Activity 9: Mana: leg press: 5 hold x 10 - B         Manual Therapy  Manual Therapy Activity 1: Knee mobs: rotations, sheer, AP/PA mobs  Manual Therapy Activity 2:  "MFR - leg pull - R/L     OP EDUCATION:  Outpatient Education  Education Comment: CONTINUE WITH CURRENT HEP  Performing to tolerance.   Assessment:  PT Assessment  Assessment Comment: Slight L hamstring cramping with bridges and SLR.  Demonstrating ease in sit to stand. Reporting greater confidence and ease in gait. Continue towards goals.  Reports knees \"feel good\" following session - some L hip discomfort.   Plan:  OP PT Plan  Treatment/Interventions: Manual therapy, Neuromuscular re-education, Self care/ home management, Therapeutic activities, Therapeutic exercises  PT Plan: Skilled PT  PT Frequency: 2 times per week  Duration: 8 weeks  Onset Date: 01/01/25  Certification Period Start Date: 05/15/25  Certification Period End Date: 08/12/25  Number of Treatments Authorized: 7/MN  Rehab Potential: Fair  Plan of Care Agreement: Patient  Monitor response to session.  Continue with manual therapy and strengthening to tolerance.   Goals:  Active       PT Problem       PT Goal 1       Start:  05/15/25    Expected End:  08/12/25       STG  1) Patient will improve ABC score by 7% in order to perform functional activities at home and in the community in 4 weeks.  2) Patient will improve 5 x sit to stand to 15 seconds to demonstrate improved LE strength and ease of transfers in 4 weeks.   3) Patient will improve RIGHT knee ROM to -10 to 105 deg's for ease of transfers and to reduce pain during ADL's and for reduced fall risk in 4 weeks.    4) Patient will be independent with HEP to allow for continued improvement in daily tasks at home and in the community in 3weeks.   LTG  1) Patient will improve OTW standing to 3\" indicating improved hip and knee ROM and improved posture towards managing back pain in 8 weeks.  2) Patient will be able to perform step up exercises at 4-6 inches for ease of stair climbing and curb negotiation for ease of community access in 8 weeks.   3) Patient will improve LEFS  >/= 45/80 to indicate " improved LE function and ability to demonstrate reduced fall risk within the community in 8 weeks.   4) Patient will improve mCTSIB to > 60/120 indicating improved balance of various surfaces and reduced fall risk in 8 weeks.    5) Patient will improve ABC score by 12% in order to perform functional activities at home and in the community in 8 weeks.               Time Calculation  Start Time: 1230  Stop Time: 1315  Time Calculation (min): 45 min  PT Therapeutic Procedures Time Entry  Therapeutic Exercise Time Entry: 26  Manual Therapy Time Entry: 13,

## 2025-06-23 ENCOUNTER — TREATMENT (OUTPATIENT)
Dept: PHYSICAL THERAPY | Facility: CLINIC | Age: 75
End: 2025-06-23
Payer: MEDICARE

## 2025-06-23 DIAGNOSIS — R26.2 DIFFICULTY WALKING: Primary | ICD-10-CM

## 2025-06-23 DIAGNOSIS — R53.81 PHYSICAL DECONDITIONING: ICD-10-CM

## 2025-06-23 PROCEDURE — 97110 THERAPEUTIC EXERCISES: CPT | Mod: GP | Performed by: PHYSICAL THERAPIST

## 2025-06-23 PROCEDURE — 97140 MANUAL THERAPY 1/> REGIONS: CPT | Mod: GP | Performed by: PHYSICAL THERAPIST

## 2025-06-23 ASSESSMENT — PAIN - FUNCTIONAL ASSESSMENT: PAIN_FUNCTIONAL_ASSESSMENT: 0-10

## 2025-06-23 ASSESSMENT — PAIN SCALES - GENERAL: PAINLEVEL_OUTOF10: 4

## 2025-06-23 NOTE — PROGRESS NOTES
Physical Therapy Treatment    Patient Name: Silvino Jacobo  MRN: [id number]  Today's Date: 6/23/2025    Current Problem  Problem List Items Addressed This Visit           ICD-10-CM    Physical deconditioning R53.81    Difficulty walking - Primary R26.2     Other Visit Diagnoses         Codes      Body mass index (BMI) 40.0-44.9, adult (Multi)     Z68.41            Insurance:  Payor: MEDICARE / Plan: MEDICARE PART A AND B / Product Type: *No Product type* /   Number of Treatments Authorized: 8/MN  Certification Period Start Date: 05/15/25  Certification Period End Date: 08/12/25    Subjective   General  Reason for Referral: Deconditioned, Knee OA, Balance  Referred By: SHARON Casanova-CNP  Past Medical History Relevant to Rehab: HTN, OA,Chronic Lymphocytic leukemia, vascular disease,Abdominal aortic aneurysm w/o rupture.    Performing HEP?: Yes    Precautions  Precautions  Precautions Comment: HTN, OA,Chronic Lymphocytic leukemia, vascular disease,Abdominal aortic aneurysm w/o rupture.  Pain  Pain Assessment: 0-10  0-10 (Numeric) Pain Score: 4  Pain Location: Knee  Pain Orientation: Right    Objective   Arrives using SPC - reporting less pain generally with activity.     Treatments:    Therapeutic Exercise  Therapeutic Exercise Activity 1: SciFit: seat 13 w/strap pedals- level 2.5 x 8 min  Therapeutic Exercise Activity 2: Heel/toe raises: x 1 MIN  Therapeutic Exercise Activity 3: Slant board calf stretch: 1 min  Therapeutic Exercise Activity 4: Standing hip abd: ALT x 1 MIN  Therapeutic Exercise Activity 5: Standing hip ext: ALT x 1 MIN  Therapeutic Exercise Activity 6: Sit to stand: 1 pads on chair / light support - 2 x 10  Therapeutic Exercise Activity 7: SLR: R/L  x 1 min  Therapeutic Exercise Activity 8: Bridge: 5 hold X 1 MIN  Therapeutic Exercise Activity 9: Mana: leg press: 5 hold x 10 - B     Manual Therapy  Manual Therapy Activity 1: Knee mobs: rotation, AP,PA, sheer - B  Manual Therapy Activity 2: MFR  "- leg pull - B    OP EDUCATION:  Outpatient Education  Education Comment: CONTINUE WITH CURRENT HEP    Assessment:  PT Assessment  Assessment Comment: No hamstring cramping today with bridges. Performing sit to stand from lower level - light UE support. Pain reducing.    Plan:  OP PT Plan  Treatment/Interventions: Manual therapy, Neuromuscular re-education, Self care/ home management, Therapeutic activities, Therapeutic exercises  PT Plan: Skilled PT  PT Frequency: 2 times per week  Duration: 8 weeks  Onset Date: 01/01/25  Certification Period Start Date: 05/15/25  Certification Period End Date: 08/12/25  Number of Treatments Authorized: 8/MN  Rehab Potential: Fair  Plan of Care Agreement: Patient    Goals:  Active       PT Problem       PT Goal 1       Start:  05/15/25    Expected End:  08/12/25       STG  1) Patient will improve ABC score by 7% in order to perform functional activities at home and in the community in 4 weeks.  2) Patient will improve 5 x sit to stand to 15 seconds to demonstrate improved LE strength and ease of transfers in 4 weeks.   3) Patient will improve RIGHT knee ROM to -10 to 105 deg's for ease of transfers and to reduce pain during ADL's and for reduced fall risk in 4 weeks.    4) Patient will be independent with HEP to allow for continued improvement in daily tasks at home and in the community in 3weeks.   LTG  1) Patient will improve OTW standing to 3\" indicating improved hip and knee ROM and improved posture towards managing back pain in 8 weeks.  2) Patient will be able to perform step up exercises at 4-6 inches for ease of stair climbing and curb negotiation for ease of community access in 8 weeks.   3) Patient will improve LEFS  >/= 45/80 to indicate improved LE function and ability to demonstrate reduced fall risk within the community in 8 weeks.   4) Patient will improve mCTSIB to > 60/120 indicating improved balance of various surfaces and reduced fall risk in 8 weeks.    5) " Patient will improve ABC score by 12% in order to perform functional activities at home and in the community in 8 weeks.               Time Calculation  Start Time: 1245  Stop Time: 1330  Time Calculation (min): 45 min  PT Therapeutic Procedures Time Entry  Therapeutic Exercise Time Entry: 32  Manual Therapy Time Entry: 8,

## 2025-06-25 ENCOUNTER — TREATMENT (OUTPATIENT)
Dept: PHYSICAL THERAPY | Facility: CLINIC | Age: 75
End: 2025-06-25
Payer: MEDICARE

## 2025-06-25 DIAGNOSIS — R26.2 DIFFICULTY WALKING: Primary | ICD-10-CM

## 2025-06-25 DIAGNOSIS — R53.81 PHYSICAL DECONDITIONING: ICD-10-CM

## 2025-06-25 PROCEDURE — 97140 MANUAL THERAPY 1/> REGIONS: CPT | Mod: GP | Performed by: PHYSICAL THERAPIST

## 2025-06-25 PROCEDURE — 97110 THERAPEUTIC EXERCISES: CPT | Mod: GP | Performed by: PHYSICAL THERAPIST

## 2025-06-25 PROCEDURE — 97530 THERAPEUTIC ACTIVITIES: CPT | Mod: GP | Performed by: PHYSICAL THERAPIST

## 2025-06-25 ASSESSMENT — PAIN - FUNCTIONAL ASSESSMENT: PAIN_FUNCTIONAL_ASSESSMENT: 0-10

## 2025-06-25 ASSESSMENT — PAIN SCALES - GENERAL: PAINLEVEL_OUTOF10: 5 - MODERATE PAIN

## 2025-06-25 NOTE — PROGRESS NOTES
Physical Therapy Treatment/Progress Note    Patient Name: Silvino Jacobo  MRN: [id number]  Today's Date: 6/25/2025    Current Problem  Problem List Items Addressed This Visit           ICD-10-CM    Physical deconditioning R53.81    Difficulty walking - Primary R26.2     Other Visit Diagnoses         Codes      Body mass index (BMI) 40.0-44.9, adult (Multi)     Z68.41            Insurance:  Payor: MEDICARE / Plan: MEDICARE PART A AND B / Product Type: *No Product type* /   Number of Treatments Authorized: 9/MN  Certification Period Start Date: 06/25/25  Certification Period End Date: 09/23/25    Subjective   General  Reason for Referral: Deconditioned, Knee OA, Balance  Referred By: SHARON Casanova-CNP  Past Medical History Relevant to Rehab: HTN, OA,Chronic Lymphocytic leukemia, vascular disease,Abdominal aortic aneurysm w/o rupture.  General Comment: Reports he is subjectively 25% improved. Greater ease in sit to stand transfers, car transfers.    Performing HEP?: Yes    Precautions  Precautions  Precautions Comment: HTN, OA,Chronic Lymphocytic leukemia, vascular disease,Abdominal aortic aneurysm w/o rupture.  Pain  Pain Assessment: 0-10  0-10 (Numeric) Pain Score: 5 - Moderate pain  Pain Location: Knee  Pain Orientation: Right    Objective   Hip AROM  R hip flexion: (125°): 105  L hip flexion: (125°): 110  R hip abduction: (45°): 15  L hip abduction: (45°): 20  R hip ER: (45°): 40  L hip ER: (45°): 40  R hip IR: (45°): 0  L hip IR: (45°): 5  Specific Lower Extremity MMT  R Iliopsoas: (5/5): 4-/5  L Iliopsoas: (5/5): 4-/5  R Gluteals (prone): (5/5): 3/5 (In available ROM)  L Gluteals (prone): (5/5): 3/5 (In available ROM)  R Gluteals (sidelying): (5/5): 3/5 (In available ROM)  L Gluteals (sidelying): (5/5): 3/5 (In available ROM)  R knee flexion: (5/5): 4/5  L knee flexion: (5/5): 4/5  R knee extension: (5/5): 4/5  L knee extension: (5/5): 4/5    5x Sit to Stand:   18 sec -  w/UE support  At eval: 27 sec w/UE  "support    Oswestry Disablity Index (SARAH):   12 (24%)  At eval: 14 (28%)    mCTSIB:   30,27,30,4 = 91/120  At eval: 25,7,3,0 = 35/120 (poor foot/ankle support on foam)    OTW:   3.5 \" (-1\" from wall)  At eval 5\" (-1\" from wall)    Treatments:  Therapeutic Exercise  Therapeutic Exercise Activity 1: SciFit: seat 13 w/strap pedals- level 2.5 x 8 min  Therapeutic Exercise Activity 4: Standing hip abd: ALT x 1 MIN  Therapeutic Exercise Activity 7: SLR: R/L  x 1 min     Manual Therapy  Manual Therapy Activity 1: Knee mobs: rotation , AP/PA/sheer  Manual Therapy Activity 2: MFR - leg pull - B    Therapeutic Activity  Therapeutic Activity 1: see measures and goal status    OP EDUCATION:  Outpatient Education  Education Comment: Edu re: POC follow up and to continue with issued HEP.    Assessment:  PT Assessment  Assessment Comment: Patient to be traveling for ~ 3 weeks. Plans to continue with HEP as he is able. Would like to follos up to see how he is managing.  May be able to progress HEP.    Plan:  OP PT Plan  Treatment/Interventions: Manual therapy, Neuromuscular re-education, Self care/ home management, Therapeutic activities, Therapeutic exercises  PT Plan: Skilled PT  PT Frequency: 2 times per week  Duration: 8 weeks  Onset Date: 01/01/25  Certification Period Start Date: 06/25/25  Certification Period End Date: 09/23/25  Number of Treatments Authorized: 9/MN  Rehab Potential: Fair  Plan of Care Agreement: Patient    Goals:  Active       PT Problem       PT Goal 1       Start:  05/15/25    Expected End:  08/12/25       STG  1) Patient will improve ABC score by 7% in order to perform functional activities at home and in the community in 4 weeks. PROGRESSED TOWARD.   2) Patient will improve 5 x sit to stand to 15 seconds to demonstrate improved LE strength and ease of transfers in 4 weeks. PROGRESSED TOWARD.  3) Patient will improve RIGHT knee ROM to -10 to 105 deg's for ease of transfers and to reduce pain during ADL's " "and for reduced fall risk in 4 weeks.  PROGRESSED TOWARDS  4) Patient will be independent with HEP to allow for continued improvement in daily tasks at home and in the community in 3weeks. ONGOING.   LTG  1) Patient will improve OTW standing to 3\" indicating improved hip and knee ROM and improved posture towards managing back pain in 8 weeks. NEARLY ACHIEVED.   2) Patient will be able to perform step up exercises at 4-6 inches for ease of stair climbing and curb negotiation for ease of community access in 8 weeks. PROGRESSING.   3) Patient will improve LEFS  >/= 45/80 to indicate improved LE function and ability to demonstrate reduced fall risk within the community in 8 weeks. PROGRESSING.   4) Patient will improve mCTSIB to > 60/120 indicating improved balance of various surfaces and reduced fall risk in 8 weeks.  ACHIEVED.   5) Patient will improve ABC score by 12% in order to perform functional activities at home and in the community in 8 weeks. PROGRESSING.               Time Calculation  Start Time: 1230  Stop Time: 1315  Time Calculation (min): 45 min  PT Therapeutic Procedures Time Entry  Therapeutic Exercise Time Entry: 10  Manual Therapy Time Entry: 10  Therapeutic Activity Time Entry: 22,    "

## 2025-07-21 ENCOUNTER — TREATMENT (OUTPATIENT)
Dept: PHYSICAL THERAPY | Facility: CLINIC | Age: 75
End: 2025-07-21
Payer: MEDICARE

## 2025-07-21 DIAGNOSIS — R26.2 DIFFICULTY WALKING: Primary | ICD-10-CM

## 2025-07-21 DIAGNOSIS — R53.81 PHYSICAL DECONDITIONING: ICD-10-CM

## 2025-07-21 PROCEDURE — 97530 THERAPEUTIC ACTIVITIES: CPT | Mod: GP | Performed by: PHYSICAL THERAPIST

## 2025-07-21 PROCEDURE — 97110 THERAPEUTIC EXERCISES: CPT | Mod: GP | Performed by: PHYSICAL THERAPIST

## 2025-07-21 ASSESSMENT — PAIN SCALES - GENERAL: PAINLEVEL_OUTOF10: 6

## 2025-07-21 ASSESSMENT — PAIN - FUNCTIONAL ASSESSMENT: PAIN_FUNCTIONAL_ASSESSMENT: 0-10

## 2025-07-21 NOTE — PROGRESS NOTES
Physical Therapy Treatment/Discharge.     Patient Name: Silvino Jacobo  MRN: [id number]  Today's Date: 7/21/2025    Current Problem  Problem List Items Addressed This Visit           ICD-10-CM    Physical deconditioning R53.81    Difficulty walking - Primary R26.2     Other Visit Diagnoses         Codes      Body mass index (BMI) 40.0-44.9, adult (Multi)     Z68.41            Insurance:  Payor: MEDICARE / Plan: MEDICARE PART A AND B / Product Type: *No Product type* /   Number of Treatments Authorized: 10/MN  Certification Period Start Date: 06/25/25  Certification Period End Date: 09/23/25    Subjective : Performed exercises x 2 weeks, then trailed off.   General  Reason for Referral: Deconditioned, Knee OA, Balance  Referred By: SHARON Casanova-CNP  Past Medical History Relevant to Rehab: HTN, OA,Chronic Lymphocytic leukemia, vascular disease,Abdominal aortic aneurysm w/o rupture.  General Comment: Last visit on 6/25/25 - reporting no change at his knees.    Performing HEP?: Partially    Precautions  Precautions  Precautions Comment: HTN, OA,Chronic Lymphocytic leukemia, vascular disease,Abdominal aortic aneurysm w/o rupture.  Pain  Pain Assessment: 0-10  0-10 (Numeric) Pain Score: 6  Pain Type: Chronic pain  Pain Location: Knee  Pain Orientation: Right    Objective     General Observation  General Observation: FWD flexed posture during gait with heavy reliance on SPC  Occiput to wall: 3.5 inches.    Specific Lower Extremity MMT   R Iliopsoas: (5/5): 4-/5  L Iliopsoas: (5/5): 4-/5  R Gluteals (prone): (5/5): 3/5 (In available ROM)  L Gluteals (prone): (5/5): 3/5 (In available ROM)  R Gluteals (sidelying): (5/5): 3/5 (In available ROM)  L Gluteals (sidelying): (5/5): 3/5 (In available ROM)  R knee flexion: (5/5): 4/5  L knee flexion: (5/5): 4/5  R knee extension: (5/5): 4/5  L knee extension: (5/5): 4/5  KNEE  Knee AROM  R knee flexion: (140°): 97  L knee flexion: (140°): 108  R knee extension: (0°): lacks  10  L knee extension: (0°): lacks 5  Treatments:    Therapeutic Exercise  Therapeutic Exercise Activity 1: SciFit: seat 13 w/strap pedals- level 2.5 x 8 min  Therapeutic Exercise Activity 2: Heel/toe raises: x 1 MIN  Therapeutic Exercise Activity 3: Slant board calf stretch: 1 min  Therapeutic Exercise Activity 4: Standing hip abd: ALT x 1 MIN  Therapeutic Exercise Activity 5: Standing hip ext: ALT x 1 MIN  Therapeutic Exercise Activity 7: SLR: R/L  x 1 min  Therapeutic Exercise Activity 8: Bridge: 5 hold X 1 MIN  Therapeutic Exercise Activity 9: Supine clam iso's: RTB x 1 min - ea    Therapeutic Activity  Therapeutic Activity 1: See measures and goal status.    OP EDUCATION:  Outpatient Education  Education Comment: Encouraged to continue with HEP and to stay as active as possible.    Assessment:  PT Assessment  Assessment Comment: Patient interested in continuing on his own at this time. Has HEP that is challenging and he can perform 3-4 days a week. Limited by severe knee OA. does not wish to pursue surgery as a option.    Plan:  OP PT Plan  Treatment/Interventions: Manual therapy, Neuromuscular re-education, Self care/ home management, Therapeutic activities, Therapeutic exercises  PT Plan: Skilled PT  PT Frequency: 2 times per week  Duration: 8 weeks  Onset Date: 01/01/25  Certification Period Start Date: 06/25/25  Certification Period End Date: 09/23/25  Number of Treatments Authorized: 10/MN  Rehab Potential: Fair  Plan of Care Agreement: Patient    Goals:  Resolved       PT Problem       PT Goal 1 (Adequate for Discharge)       Start:  05/15/25    Expected End:  08/12/25    Resolved:  07/21/25    STG  1) Patient will improve ABC score by 7% in order to perform functional activities at home and in the community in 4 weeks. PROGRESSED TOWARD.   2) Patient will improve 5 x sit to stand to 15 seconds to demonstrate improved LE strength and ease of transfers in 4 weeks. NEARLY ACHIEVED.   3) Patient will improve  "RIGHT knee ROM to -10 to 105 deg's for ease of transfers and to reduce pain during ADL's and for reduced fall risk in 4 weeks.  PROGRESSED TOWARDS  4) Patient will be independent with HEP to allow for continued improvement in daily tasks at home and in the community in 3weeks. ONGOING.   LTG  1) Patient will improve OTW standing to 3\" indicating improved hip and knee ROM and improved posture towards managing back pain in 8 weeks. NEARLY ACHIEVED.   2) Patient will be able to perform step up exercises at 4-6 inches for ease of stair climbing and curb negotiation for ease of community access in 8 weeks. PROGRESSING.   3) Patient will improve LEFS  >/= 45/80 to indicate improved LE function and ability to demonstrate reduced fall risk within the community in 8 weeks. PROGRESSING.   4) Patient will improve mCTSIB to > 60/120 indicating improved balance of various surfaces and reduced fall risk in 8 weeks.  ACHIEVED.   5) Patient will improve ABC score by 12% in order to perform functional activities at home and in the community in 8 weeks. PROGRESSING.               Time Calculation  Start Time: 1200  Stop Time: 1245  Time Calculation (min): 45 min  PT Therapeutic Procedures Time Entry  Therapeutic Exercise Time Entry: 15  Therapeutic Activity Time Entry: 23,    "

## 2025-07-24 ENCOUNTER — APPOINTMENT (OUTPATIENT)
Dept: VASCULAR MEDICINE | Facility: CLINIC | Age: 75
End: 2025-07-24
Payer: COMMERCIAL

## 2025-07-24 ENCOUNTER — APPOINTMENT (OUTPATIENT)
Dept: VASCULAR SURGERY | Facility: CLINIC | Age: 75
End: 2025-07-24
Payer: COMMERCIAL

## 2025-07-31 ENCOUNTER — OFFICE VISIT (OUTPATIENT)
Dept: VASCULAR SURGERY | Facility: CLINIC | Age: 75
End: 2025-07-31
Payer: MEDICARE

## 2025-07-31 ENCOUNTER — HOSPITAL ENCOUNTER (OUTPATIENT)
Dept: VASCULAR MEDICINE | Facility: CLINIC | Age: 75
Discharge: HOME | End: 2025-07-31
Payer: MEDICARE

## 2025-07-31 VITALS
OXYGEN SATURATION: 94 % | SYSTOLIC BLOOD PRESSURE: 148 MMHG | DIASTOLIC BLOOD PRESSURE: 81 MMHG | WEIGHT: 287.7 LBS | HEIGHT: 71 IN | BODY MASS INDEX: 40.28 KG/M2 | HEART RATE: 50 BPM

## 2025-07-31 DIAGNOSIS — I71.40 ABDOMINAL AORTIC ANEURYSM (AAA) WITHOUT RUPTURE, UNSPECIFIED PART: ICD-10-CM

## 2025-07-31 DIAGNOSIS — R09.89 OTHER SPECIFIED SYMPTOMS AND SIGNS INVOLVING THE CIRCULATORY AND RESPIRATORY SYSTEMS: ICD-10-CM

## 2025-07-31 DIAGNOSIS — I71.43 INFRARENAL ABDOMINAL AORTIC ANEURYSM (AAA) WITHOUT RUPTURE: Primary | ICD-10-CM

## 2025-07-31 PROCEDURE — 99214 OFFICE O/P EST MOD 30 MIN: CPT | Performed by: SURGERY

## 2025-07-31 PROCEDURE — 1160F RVW MEDS BY RX/DR IN RCRD: CPT | Performed by: SURGERY

## 2025-07-31 PROCEDURE — 93880 EXTRACRANIAL BILAT STUDY: CPT | Performed by: INTERNAL MEDICINE

## 2025-07-31 PROCEDURE — 93880 EXTRACRANIAL BILAT STUDY: CPT

## 2025-07-31 PROCEDURE — 93978 VASCULAR STUDY: CPT

## 2025-07-31 PROCEDURE — 1036F TOBACCO NON-USER: CPT | Performed by: SURGERY

## 2025-07-31 PROCEDURE — 99214 OFFICE O/P EST MOD 30 MIN: CPT | Mod: 25 | Performed by: SURGERY

## 2025-07-31 PROCEDURE — 3077F SYST BP >= 140 MM HG: CPT | Performed by: SURGERY

## 2025-07-31 PROCEDURE — 1159F MED LIST DOCD IN RCRD: CPT | Performed by: SURGERY

## 2025-07-31 PROCEDURE — 3078F DIAST BP <80 MM HG: CPT | Performed by: SURGERY

## 2025-07-31 PROCEDURE — 1126F AMNT PAIN NOTED NONE PRSNT: CPT | Performed by: SURGERY

## 2025-07-31 PROCEDURE — 93978 VASCULAR STUDY: CPT | Performed by: INTERNAL MEDICINE

## 2025-07-31 ASSESSMENT — PAIN SCALES - GENERAL: PAINLEVEL_OUTOF10: 0-NO PAIN

## 2025-07-31 NOTE — PATIENT INSTRUCTIONS
- AAA measures 3.1 cm today on ultrasound, previously 3.5 cm. Suspect this is due to mural thrombus making it look smaller on the ultrasound  - Will plan on CT scan in 1 year to fully evaluate AAA  - Will need blood draw prior to scan  - Call radiology [phone] to schedule scan  - Call office [phone] with any questions or concerns

## 2025-07-31 NOTE — PROGRESS NOTES
Vascular Surgery Clinic Note    CC: Infrarenal AAA    HPI:  Silvino Jacobo is 75 y.o. male with history of infrarenal AAA 3.6 cm found on surveillance US. He is here with new surveillance US results. Denies chest, abdominal or new back pain. He does have chronic back pain. Ambulating and able to perform daily activities of living.      Past Vascular History:  None     Past Vascular Testing:  Carotid US (7/31/25) - R 86/13 (1.2), L 66/18 (1.1)  Aortic US (7/31/25) - 3.1 cm  Aortic US (5/13/24) - 3.6 cm  Aortic US screening (5/11/20) - 3.5 cm      Medical History:  Problem List[1]     Meds:   Medications Ordered Prior to Encounter[2]     Allergies:   RX Allergies[3]    SH:    Social Drivers of Health     Tobacco Use: Low Risk  (7/31/2025)    Patient History     Smoking Tobacco Use: Never     Smokeless Tobacco Use: Never     Passive Exposure: Never   Alcohol Use: Not At Risk (4/30/2025)    AUDIT-C     Frequency of Alcohol Consumption: Never     Average Number of Drinks: Patient does not drink     Frequency of Binge Drinking: Never   Financial Resource Strain: Not on file   Food Insecurity: Not on file   Transportation Needs: Not on file   Physical Activity: Not on file   Stress: Not on file   Social Connections: Not on file   Intimate Partner Violence: Not on file   Depression: Not at risk (7/31/2025)    PHQ-2     PHQ-2 Score: 0   Housing Stability: Not on file   Utilities: Not on file   Digital Equity: Not on file   Health Literacy: Not on file        FH:  Family History[4]     ROS:  All systems were reviewed and are negative except as per HPI.    Objective:  Vitals:  Vitals:    07/31/25 1015   BP: 148/81   Pulse: 50   SpO2:         Exam:  Constitutional: normal, well appearing  HEENT: normocephalic  CV: regular rate  RESP: symmetric expansion, unlabored breathing  GI: soft, nontender, nondistended  MSK: normal ROM  INT: no lesions  PSYCH: appropriate mood  NEURO: no deficits  VASC:     Assessment & Plan:  Silvino  Odlais is 75 y.o. male with small infrarenal AAA    - AAA measures 3.1 cm today on ultrasound, previously 3.5 cm. Suspect this is due to mural thrombus making it look smaller on the ultrasound  - Will plan on CT scan in 1 year to fully evaluate AAA  - Will need blood draw prior to scan  - Carotid duplex within normal limits    Meds  - Pravastatin 80 mg    Screening/Surveillance  - CTA a/p (July 2026)    Next Followup  - 1 year    Carol Gonsales MD               [1]   Patient Active Problem List  Diagnosis    Abdominal aortic aneurysm without rupture    Benign prostatic hyperplasia with urinary obstruction    Bigeminy    Chronic lymphocytic leukemia (Multi)    Hyperlipidemia    Hypertension    Hypothyroidism    Impaired fasting glucose    Leukocytosis    Nocturnal hypoxemia    Organic mixed sleep disorder    Obstructive sleep apnea (adult) (pediatric)    Osteoarthritis    Otitis externa    Periodic limb movement    Sinus bradycardia    Vitamin D deficiency    Chronic rhinitis    Eye tearing, left    Physical deconditioning    Difficulty walking   [2]   Current Outpatient Medications on File Prior to Visit   Medication Sig Dispense Refill    atenolol (Tenormin) 50 mg tablet TAKE 1 TABLET BY MOUTH EVERY DAY 90 tablet 3    cholecalciferol, vitamin D3, (VITAMIN D3 ORAL) Take by mouth.      furosemide (Lasix) 20 mg tablet Take 1 tablet (20 mg) by mouth once daily. as directed 90 tablet 3    levothyroxine (Synthroid, Levoxyl) 100 mcg tablet TAKE 1 TABLET BY MOUTH EVERY DAY 90 tablet 3    MAGNESIUM ORAL Take by mouth once daily.      multivit-min/iron/folic acid/K (ADULTS MULTIVITAMIN ORAL) Take by mouth.      naproxen sodium (Aleve) 220 mg tablet Take 1 tablet (220 mg) by mouth every 12 hours if needed.      pravastatin (Pravachol) 80 mg tablet Take 1 tablet (80 mg) by mouth once daily. 90 tablet 3     No current facility-administered medications on file prior to visit.   [3] No Known Allergies  [4]   Family History  Problem  Relation Name Age of Onset    Hypertension Mother      Heart disease Father

## 2025-08-05 ENCOUNTER — LAB (OUTPATIENT)
Dept: LAB | Facility: CLINIC | Age: 75
End: 2025-08-05
Payer: MEDICARE

## 2025-08-05 ENCOUNTER — OFFICE VISIT (OUTPATIENT)
Dept: HEMATOLOGY/ONCOLOGY | Facility: CLINIC | Age: 75
End: 2025-08-05
Payer: MEDICARE

## 2025-08-05 VITALS
HEART RATE: 53 BPM | TEMPERATURE: 98.4 F | SYSTOLIC BLOOD PRESSURE: 134 MMHG | DIASTOLIC BLOOD PRESSURE: 75 MMHG | RESPIRATION RATE: 18 BRPM | BODY MASS INDEX: 40.62 KG/M2 | WEIGHT: 287.15 LBS | OXYGEN SATURATION: 95 %

## 2025-08-05 DIAGNOSIS — E11.9 DIABETES MELLITUS WITHOUT COMPLICATION: ICD-10-CM

## 2025-08-05 DIAGNOSIS — C91.10 CHRONIC LYMPHOCYTIC LEUKEMIA (MULTI): Primary | ICD-10-CM

## 2025-08-05 DIAGNOSIS — C91.10 CHRONIC LYMPHOCYTIC LEUKEMIA (MULTI): ICD-10-CM

## 2025-08-05 LAB
ALBUMIN SERPL BCP-MCNC: 4.5 G/DL (ref 3.4–5)
ALP SERPL-CCNC: 67 U/L (ref 33–136)
ALT SERPL W P-5'-P-CCNC: 28 U/L (ref 10–52)
ANION GAP SERPL CALC-SCNC: 14 MMOL/L (ref 10–20)
AST SERPL W P-5'-P-CCNC: 21 U/L (ref 9–39)
BASOPHILS # BLD AUTO: 0.09 X10*3/UL (ref 0–0.1)
BASOPHILS NFR BLD AUTO: 0.6 %
BILIRUB SERPL-MCNC: 0.7 MG/DL (ref 0–1.2)
BUN SERPL-MCNC: 18 MG/DL (ref 6–23)
CALCIUM SERPL-MCNC: 9.1 MG/DL (ref 8.6–10.3)
CHLORIDE SERPL-SCNC: 105 MMOL/L (ref 98–107)
CO2 SERPL-SCNC: 24 MMOL/L (ref 21–32)
CREAT SERPL-MCNC: 1.07 MG/DL (ref 0.5–1.3)
EGFRCR SERPLBLD CKD-EPI 2021: 72 ML/MIN/1.73M*2
EOSINOPHIL # BLD AUTO: 0.22 X10*3/UL (ref 0–0.4)
EOSINOPHIL NFR BLD AUTO: 1.4 %
ERYTHROCYTE [DISTWIDTH] IN BLOOD BY AUTOMATED COUNT: 13.1 % (ref 11.5–14.5)
GLUCOSE SERPL-MCNC: 128 MG/DL (ref 74–99)
HCT VFR BLD AUTO: 43 % (ref 41–52)
HGB BLD-MCNC: 15.2 G/DL (ref 13.5–17.5)
IMM GRANULOCYTES # BLD AUTO: 0.05 X10*3/UL (ref 0–0.5)
IMM GRANULOCYTES NFR BLD AUTO: 0.3 % (ref 0–0.9)
LYMPHOCYTES # BLD AUTO: 9.71 X10*3/UL (ref 0.8–3)
LYMPHOCYTES NFR BLD AUTO: 60.6 %
MCH RBC QN AUTO: 32.2 PG (ref 26–34)
MCHC RBC AUTO-ENTMCNC: 35.3 G/DL (ref 32–36)
MCV RBC AUTO: 91 FL (ref 80–100)
MONOCYTES # BLD AUTO: 0.77 X10*3/UL (ref 0.05–0.8)
MONOCYTES NFR BLD AUTO: 4.8 %
NEUTROPHILS # BLD AUTO: 5.19 X10*3/UL (ref 1.6–5.5)
NEUTROPHILS NFR BLD AUTO: 32.3 %
NRBC BLD-RTO: 0 /100 WBCS (ref 0–0)
PLATELET # BLD AUTO: 206 X10*3/UL (ref 150–450)
POTASSIUM SERPL-SCNC: 4 MMOL/L (ref 3.5–5.3)
PROT SERPL-MCNC: 6.9 G/DL (ref 6.4–8.2)
RBC # BLD AUTO: 4.72 X10*6/UL (ref 4.5–5.9)
SODIUM SERPL-SCNC: 139 MMOL/L (ref 136–145)
WBC # BLD AUTO: 16 X10*3/UL (ref 4.4–11.3)

## 2025-08-05 PROCEDURE — 80053 COMPREHEN METABOLIC PANEL: CPT

## 2025-08-05 PROCEDURE — 1125F AMNT PAIN NOTED PAIN PRSNT: CPT | Performed by: NURSE PRACTITIONER

## 2025-08-05 PROCEDURE — 3075F SYST BP GE 130 - 139MM HG: CPT | Performed by: NURSE PRACTITIONER

## 2025-08-05 PROCEDURE — 99214 OFFICE O/P EST MOD 30 MIN: CPT | Performed by: NURSE PRACTITIONER

## 2025-08-05 PROCEDURE — 85025 COMPLETE CBC W/AUTO DIFF WBC: CPT

## 2025-08-05 PROCEDURE — 3078F DIAST BP <80 MM HG: CPT | Performed by: NURSE PRACTITIONER

## 2025-08-05 PROCEDURE — 83615 LACTATE (LD) (LDH) ENZYME: CPT

## 2025-08-05 PROCEDURE — 36415 COLL VENOUS BLD VENIPUNCTURE: CPT

## 2025-08-05 ASSESSMENT — PAIN SCALES - GENERAL: PAINLEVEL_OUTOF10: 5

## 2025-08-05 ASSESSMENT — ENCOUNTER SYMPTOMS
CARDIOVASCULAR NEGATIVE: 1
FEVER: 0
GASTROINTESTINAL NEGATIVE: 1
UNEXPECTED WEIGHT CHANGE: 0
ARTHRALGIAS: 1
HEMATOLOGIC/LYMPHATIC NEGATIVE: 1
NEUROLOGICAL NEGATIVE: 1
DIAPHORESIS: 0
BACK PAIN: 1
FATIGUE: 1

## 2025-08-05 NOTE — PROGRESS NOTES
Patient ID: Silvino Jacobo is a 75 y.o. male.  Referring Physician: Ree Meza PA-C  [address] [place] [address]  [facility] [address]  [place] [address]  Primary Care Provider: JIMMIE Rashid  Visit Type: Follow Up      Subjective    HPI  Patient had been referred by his primary care provider for an elevated white count of 23,000. Hemoglobin and platelets were normal. There was a predominance of lymphocytes. He was seen in our office initially in April 2017. At that time, his white count was 23,600. Flow cytometry showed a B cell population consistent with CLL. FISH studies showed normal results. The patient has not required any treatments.  WBC has remained stable since diagnosis     Interval History:  He is seen today in follow up evaluation. He denies any fevers, chills or night sweats. No cough, chest pain or shortness of breath. No nausea or vomiting. No diarrhea or constipation. He has not had any unexplained weight loss. No night sweats. No lymphadenopathy. He is working on improved diet choices. He has chronic back pain  He does have poor dentition. He has had several broken teeth. He has meet with an oral surgeon and is considering dentures with implants    Review of Systems   Constitutional:  Positive for fatigue. Negative for diaphoresis, fever and unexpected weight change.   HENT:  Negative.     Cardiovascular: Negative.    Gastrointestinal: Negative.    Musculoskeletal:  Positive for arthralgias and back pain.   Skin: Negative.    Neurological: Negative.    Hematological: Negative.         Objective   BSA: 2.54 meters squared  /75 (BP Location: Right arm, Patient Position: Sitting, BP Cuff Size: Adult long)   Pulse 53   Temp 36.9 °C (98.4 °F) (Temporal)   Resp 18   Wt 130 kg (287 lb 2.4 oz)   SpO2 95%   BMI 40.62 kg/m²      has no past medical history on file.   has no past surgical history on file.  Family History[1]      Silvino Jacobo  reports that he has never smoked. He has never been exposed to  tobacco smoke. He has never used smokeless tobacco.  He  reports that he does not currently use alcohol.  He  reports no history of drug use.    Physical Exam  Constitutional:       Appearance: He is obese.     Eyes:      Conjunctiva/sclera: Conjunctivae normal.       Cardiovascular:      Rate and Rhythm: Normal rate and regular rhythm.      Pulses: Normal pulses.      Heart sounds: No murmur heard.  Pulmonary:      Effort: Pulmonary effort is normal. No respiratory distress.      Breath sounds: Normal breath sounds. No wheezing.   Abdominal:      General: There is distension.      Palpations: There is mass.      Tenderness: There is no abdominal tenderness.     Musculoskeletal:         General: Swelling (Bilateral LE edema,) present. No tenderness.   Lymphadenopathy:      Cervical: No cervical adenopathy.     Skin:     Coloration: Skin is not jaundiced.      Findings: No bruising.     Neurological:      Motor: No weakness.       WBC   Date/Time Value Ref Range Status   08/05/2025 11:54 AM 16.0 (H) 4.4 - 11.3 x10*3/uL Final   01/31/2025 11:43 AM 16.7 (H) 4.4 - 11.3 x10*3/uL Final   08/12/2024 01:42 PM 13.0 (H) 4.4 - 11.3 x10*3/uL Final     WHITE BLOOD CELL COUNT   Date/Time Value Ref Range Status   04/30/2025 10:03 AM 13.5 (H) 3.8 - 10.8 Thousand/uL Final     nRBC   Date Value Ref Range Status   08/05/2025 0.0 0.0 - 0.0 /100 WBCs Final   01/31/2025 0.0 0.0 - 0.0 /100 WBCs Final   08/12/2024 0.0 0.0 - 0.0 /100 WBCs Final     RBC   Date Value Ref Range Status   08/05/2025 4.72 4.50 - 5.90 x10*6/uL Final   01/31/2025 4.75 4.50 - 5.90 x10*6/uL Final   08/12/2024 4.42 (L) 4.50 - 5.90 x10*6/uL Final     RED BLOOD CELL COUNT   Date Value Ref Range Status   04/30/2025 4.63 4.20 - 5.80 Million/uL Final     Hemoglobin   Date Value Ref Range Status   08/05/2025 15.2 13.5 - 17.5 g/dL Final   01/31/2025 15.4 13.5 - 17.5 g/dL Final   08/12/2024 14.4 13.5 - 17.5 g/dL Final     HEMOGLOBIN   Date Value Ref Range Status   04/30/2025  15.0 13.2 - 17.1 g/dL Final     Hematocrit   Date Value Ref Range Status   08/05/2025 43.0 41.0 - 52.0 % Final   01/31/2025 44.5 41.0 - 52.0 % Final   08/12/2024 41.3 41.0 - 52.0 % Final     HEMATOCRIT   Date Value Ref Range Status   04/30/2025 44.0 38.5 - 50.0 % Final     MCV   Date/Time Value Ref Range Status   08/05/2025 11:54 AM 91 80 - 100 fL Final   04/30/2025 10:03 AM 95.0 80.0 - 100.0 fL Final   01/31/2025 11:43 AM 94 80 - 100 fL Final   08/12/2024 01:42 PM 93 80 - 100 fL Final     MCH   Date/Time Value Ref Range Status   08/05/2025 11:54 AM 32.2 26.0 - 34.0 pg Final   04/30/2025 10:03 AM 32.4 27.0 - 33.0 pg Final   01/31/2025 11:43 AM 32.4 26.0 - 34.0 pg Final   08/12/2024 01:42 PM 32.6 26.0 - 34.0 pg Final     MCHC   Date/Time Value Ref Range Status   08/05/2025 11:54 AM 35.3 32.0 - 36.0 g/dL Final   04/30/2025 10:03 AM 34.1 32.0 - 36.0 g/dL Final     Comment:     For adults, a slight decrease in the calculated MCHC  value (in the range of 30 to 32 g/dL) is most likely  not clinically significant; however, it should be  interpreted with caution in correlation with other  red cell parameters and the patient's clinical  condition.     01/31/2025 11:43 AM 34.6 32.0 - 36.0 g/dL Final   08/12/2024 01:42 PM 34.9 32.0 - 36.0 g/dL Final     RDW   Date/Time Value Ref Range Status   08/05/2025 11:54 AM 13.1 11.5 - 14.5 % Final   04/30/2025 10:03 AM 12.6 11.0 - 15.0 % Final   01/31/2025 11:43 AM 13.0 11.5 - 14.5 % Final   08/12/2024 01:42 PM 13.2 11.5 - 14.5 % Final     Platelets   Date/Time Value Ref Range Status   08/05/2025 11:54  150 - 450 x10*3/uL Final   01/31/2025 11:43  150 - 450 x10*3/uL Final   08/12/2024 01:42  150 - 450 x10*3/uL Final     PLATELET COUNT   Date/Time Value Ref Range Status   04/30/2025 10:03  140 - 400 Thousand/uL Final     MPV   Date/Time Value Ref Range Status   04/30/2025 10:03 AM 10.4 7.5 - 12.5 fL Final   06/20/2023 01:17 PM 10.2 7.0 - 12.6 CU Final   04/19/2023  09:34 AM 10.9 7.0 - 12.6 CU Final   04/13/2022 09:42 AM 10.7 7.0 - 12.6 CU Final     Neutrophils %   Date/Time Value Ref Range Status   08/05/2025 11:54 AM 32.3 40.0 - 80.0 % Final     Immature Granulocytes %, Automated   Date/Time Value Ref Range Status   08/05/2025 11:54 AM 0.3 0.0 - 0.9 % Final     Comment:     Immature Granulocyte Count (IG) includes promyelocytes, myelocytes and metamyelocytes but does not include bands. Percent differential counts (%) should be interpreted in the context of the absolute cell counts (cells/UL).   01/31/2025 11:43 AM 0.3 0.0 - 0.9 % Final     Comment:     Immature Granulocyte Count (IG) includes promyelocytes, myelocytes and metamyelocytes but does not include bands. Percent differential counts (%) should be interpreted in the context of the absolute cell counts (cells/UL).   08/12/2024 01:42 PM 0.3 0.0 - 0.9 % Final     Comment:     Immature Granulocyte Count (IG) includes promyelocytes, myelocytes and metamyelocytes but does not include bands. Percent differential counts (%) should be interpreted in the context of the absolute cell counts (cells/UL).     Lymphocytes %, Manual   Date/Time Value Ref Range Status   01/31/2025 11:43 AM 58.0 13.0 - 44.0 % Final   08/12/2024 01:42 PM 53.0 13.0 - 44.0 % Final     Comment:     The WBC differential count may be inaccurate due to cellular degeneration   01/17/2024 01:10 PM 62.0 13.0 - 44.0 % Final     Lymphocytes %   Date/Time Value Ref Range Status   08/05/2025 11:54 AM 60.6 13.0 - 44.0 % Final     LYMPHOCYTES   Date/Time Value Ref Range Status   04/30/2025 10:03 AM 55.1 % Final     Monocytes %, Manual   Date/Time Value Ref Range Status   01/31/2025 11:43 AM 6.0 2.0 - 10.0 % Final   08/12/2024 01:42 PM 3.0 2.0 - 10.0 % Final   01/17/2024 01:10 PM 5.0 2.0 - 10.0 % Final     Monocytes %   Date/Time Value Ref Range Status   08/05/2025 11:54 AM 4.8 2.0 - 10.0 % Final     MONOCYTES   Date/Time Value Ref Range Status   04/30/2025 10:03 AM  4.3 % Final     Eosinophils %, Manual   Date/Time Value Ref Range Status   01/31/2025 11:43 AM 1.0 0.0 - 6.0 % Final   08/12/2024 01:42 PM 0.0 0.0 - 6.0 % Final   01/17/2024 01:10 PM 2.0 0.0 - 6.0 % Final     Eosinophils %   Date/Time Value Ref Range Status   08/05/2025 11:54 AM 1.4 0.0 - 6.0 % Final     EOSINOPHILS   Date/Time Value Ref Range Status   04/30/2025 10:03 AM 1.1 % Final     Basophils %, Manual   Date/Time Value Ref Range Status   01/31/2025 11:43 AM 1.0 0.0 - 2.0 % Final   08/12/2024 01:42 PM 1.0 0.0 - 2.0 % Final   01/17/2024 01:10 PM 0.0 0.0 - 2.0 % Final     Basophils %   Date/Time Value Ref Range Status   08/05/2025 11:54 AM 0.6 0.0 - 2.0 % Final     BASOPHILS   Date/Time Value Ref Range Status   04/30/2025 10:03 AM 0.5 % Final     Neutrophils Absolute   Date/Time Value Ref Range Status   08/05/2025 11:54 AM 5.19 1.60 - 5.50 x10*3/uL Final     Comment:     Percent differential counts (%) should be interpreted in the context of the absolute cell counts (cells/uL).   06/20/2023 01:17 PM 5.00 1.8 - 7.7 K/UL Final   11/19/2021 12:28 PM 5.44 1.8 - 7.7 K/UL Final   05/17/2021 10:07 AM 5.24 1.8 - 7.7 K/UL Final     Immature Granulocytes Absolute, Automated   Date/Time Value Ref Range Status   08/05/2025 11:54 AM 0.05 0.00 - 0.50 x10*3/uL Final   01/31/2025 11:43 AM 0.05 0.00 - 0.50 x10*3/uL Final   08/12/2024 01:42 PM 0.04 0.00 - 0.50 x10*3/uL Final     Lymphocytes Absolute   Date/Time Value Ref Range Status   08/05/2025 11:54 AM 9.71 (H) 0.80 - 3.00 x10*3/uL Final   06/20/2023 01:17 PM 7.92 (H) 1.2 - 3.2 K/UL Final   11/19/2021 12:28 PM 7.63 (H) 1.2 - 3.2 K/UL Final   05/17/2021 10:07 AM 8.18 (H) 1.2 - 3.2 K/UL Final     Monocytes Absolute   Date/Time Value Ref Range Status   08/05/2025 11:54 AM 0.77 0.05 - 0.80 x10*3/uL Final   06/20/2023 01:17 PM 0.70 0 - 0.8 K/UL Final   11/19/2021 12:28 PM 0.64 0 - 0.8 K/UL Final   05/17/2021 10:07 AM 0.68 0 - 0.8 K/UL Final     Eosinophils Absolute   Date/Time Value  Ref Range Status   08/05/2025 11:54 AM 0.22 0.00 - 0.40 x10*3/uL Final     Eosinophils Absolute, Manual   Date/Time Value Ref Range Status   01/31/2025 11:43 AM 0.17 0.00 - 0.40 x10*3/uL Final   08/12/2024 01:42 PM 0.00 0.00 - 0.40 x10*3/uL Final   01/17/2024 01:10 PM 0.29 0.00 - 0.40 x10*3/uL Final     ABSOLUTE EOSINOPHILS   Date/Time Value Ref Range Status   04/30/2025 10:03  15 - 500 cells/uL Final     Basophils Absolute   Date/Time Value Ref Range Status   08/05/2025 11:54 AM 0.09 0.00 - 0.10 x10*3/uL Final     Basophils Absolute, Manual   Date/Time Value Ref Range Status   01/31/2025 11:43 AM 0.17 (H) 0.00 - 0.10 x10*3/uL Final   08/12/2024 01:42 PM 0.13 (H) 0.00 - 0.10 x10*3/uL Final   01/17/2024 01:10 PM 0.00 0.00 - 0.10 x10*3/uL Final     ABSOLUTE BASOPHILS   Date/Time Value Ref Range Status   04/30/2025 10:03 AM 68 0 - 200 cells/uL Final         Assessment/Plan    CLL:  - counts remain stable. He is not requiring treatment at this juncture.  - he may have dental work as necessary. We discussed monitoring for s/s infection.      Plan:  - 6 month follow-up  - labs on return      Diagnoses and all orders for this visit:  Chronic lymphocytic leukemia (Multi)  -     CBC and Auto Differential; Future  -     Clinic Appointment Request Follow up  -     CBC and Auto Differential; Future  -     Comprehensive Metabolic Panel; Future  -     Lactate Dehydrogenase; Future  -     Clinic Appointment Request Follow up; Future           Ree Mzea PA-C                              [1]   Family History  Problem Relation Name Age of Onset    Hypertension Mother      Heart disease Father

## 2025-08-06 LAB — LDH SERPL L TO P-CCNC: 175 U/L (ref 84–246)

## 2026-06-15 ENCOUNTER — APPOINTMENT (OUTPATIENT)
Dept: SLEEP MEDICINE | Facility: CLINIC | Age: 76
End: 2026-06-15
Payer: MEDICARE